# Patient Record
Sex: MALE | Race: WHITE | NOT HISPANIC OR LATINO | Employment: FULL TIME | URBAN - NONMETROPOLITAN AREA
[De-identification: names, ages, dates, MRNs, and addresses within clinical notes are randomized per-mention and may not be internally consistent; named-entity substitution may affect disease eponyms.]

---

## 2017-01-06 ENCOUNTER — TELEPHONE (OUTPATIENT)
Dept: UROLOGY | Facility: CLINIC | Age: 42
End: 2017-01-06

## 2017-01-06 NOTE — TELEPHONE ENCOUNTER
Patients first semen sample was positive. Dr Gracia looked at it and would like for him to bring another sample in one month and to continue using birth control.

## 2017-01-11 ENCOUNTER — OFFICE VISIT (OUTPATIENT)
Dept: OTOLARYNGOLOGY | Facility: CLINIC | Age: 42
End: 2017-01-11

## 2017-01-11 VITALS
TEMPERATURE: 97.3 F | SYSTOLIC BLOOD PRESSURE: 130 MMHG | BODY MASS INDEX: 25.87 KG/M2 | HEIGHT: 72 IN | WEIGHT: 191 LBS | HEART RATE: 80 BPM | RESPIRATION RATE: 18 BRPM | DIASTOLIC BLOOD PRESSURE: 80 MMHG

## 2017-01-11 DIAGNOSIS — D49.1 LARYNGEAL NEOPLASM: ICD-10-CM

## 2017-01-11 DIAGNOSIS — R09.89 GLOBUS SENSATION: Primary | ICD-10-CM

## 2017-01-11 DIAGNOSIS — J35.1 LINGUAL TONSIL HYPERTROPHY: ICD-10-CM

## 2017-01-11 DIAGNOSIS — Z72.0 TOBACCO ABUSE: ICD-10-CM

## 2017-01-11 DIAGNOSIS — J35.1 TONSILLAR HYPERTROPHY: ICD-10-CM

## 2017-01-11 DIAGNOSIS — K21.9 LARYNGOPHARYNGEAL REFLUX (LPR): ICD-10-CM

## 2017-01-11 PROCEDURE — 99214 OFFICE O/P EST MOD 30 MIN: CPT | Performed by: NURSE PRACTITIONER

## 2017-01-11 PROCEDURE — 4004F PT TOBACCO SCREEN RCVD TLK: CPT | Performed by: NURSE PRACTITIONER

## 2017-01-11 PROCEDURE — 31575 DIAGNOSTIC LARYNGOSCOPY: CPT | Performed by: NURSE PRACTITIONER

## 2017-01-11 RX ORDER — PANTOPRAZOLE SODIUM 40 MG/1
40 TABLET, DELAYED RELEASE ORAL DAILY
Qty: 30 TABLET | Refills: 5 | Status: SHIPPED | OUTPATIENT
Start: 2017-01-11 | End: 2017-01-11 | Stop reason: SDUPTHER

## 2017-01-11 RX ORDER — PANTOPRAZOLE SODIUM 40 MG/1
TABLET, DELAYED RELEASE ORAL
Qty: 30 TABLET | Refills: 5 | Status: SHIPPED | OUTPATIENT
Start: 2017-01-11

## 2017-01-11 NOTE — PROGRESS NOTES
OPERATIVE NOTE:    PROCEDURE:   Flexible Fiberoptic Laryngoscopy    ANESTHESIA:  None    REASON FOR PROCEDURE:  Procedure was recommend for suspicious clinical behavior unresponsive to medical management.  Risks, benefits and alternatives were discussed.      DETAILS of OPERATION:  The patient was seated in the exam chair.  A flexible fiberoptic laryngoscopy was performed through the nasal cavity.  The scope was introduced into the oral cavity and directed to the level of the glottis, examining the structures of the oropharynx, base of tongue, vallecula, supraglottic larynx, glottic larynx, and hypopharynx.      FINDINGS:  Mucosal surfaces:   The mucosal surfaces demonstrated normal mucosa surfaces without inflammation.    NASOPHARYNX:  Normal to inspection - no mass    Base of tongue:  The base of tongue was found to have moderate hypertrophy    Epiglottis:  The epiglottis was found to have severe erythema to laryngeal surface    Aryepligottic fold:  The AE folds were found to have no mass or lesion.    False Vocal Fold:  The false cords were found to have no mass or lesion.    True Vocal Cord:  The true vocal cords were found to have no mass or lesion.    Arytenoid:   The arytenoids were found to have erythema    Hypopharynx:  The hypopharynx was found to have no mass or lesion.    The patient tolerated procedure well.

## 2017-01-11 NOTE — MR AVS SNAPSHOT
Donald Kasper   1/11/2017 11:00 AM   Office Visit    Dept Phone:  [phone]   Encounter #:  [id number]    Provider:  MITESH Almeida   Department:  [facility]                Your Full Care Plan              Today's Medication Changes          These changes are accurate as of: 1/11/17 11:49 AM.  If you have any questions, ask your nurse or doctor.               Stop taking medication(s)listed here:     HYDROcodone-acetaminophen 7.5-325 MG per tablet   Commonly known as:  NORCO   Stopped by:  MITESH Almeida                      Your Updated Medication List          This list is accurate as of: 1/11/17 11:49 AM.  Always use your most recent med list.                ALPRAZolam 2 MG tablet   Commonly known as:  XANAX   Take 1 tablet by mouth As Needed for anxiety (BRING THIS TO PROCEDURE TO TAKE BEFORE) for up to 1 dose.       aspirin 81 MG EC tablet       atorvastatin 20 MG tablet   Commonly known as:  LIPITOR       LORazepam 1 MG tablet   Commonly known as:  ATIVAN       MULTIVITAMIN ADULT tablet       omeprazole 20 MG capsule   Commonly known as:  priLOSEC       Testosterone Cypionate & Prop 200-20 MG/ML solution               You Were Diagnosed With        Codes Comments    Globus sensation    -  Primary ICD-10-CM: F45.8  ICD-9-CM: 306.4     Lingual tonsil hypertrophy     ICD-10-CM: J35.1  ICD-9-CM: 474.11       Instructions     None    Patient Instructions History      Upcoming Appointments     Visit Type Date Time Department    FOLLOW UP 1/11/2017 11:00 AM [facility] [place]    FOLLOW UP 4/17/2017 11:00 AM [facility] [place]      [employer] Signup     [facility] [employer] allows you to send messages to your doctor, view your test results, renew your prescriptions, schedule appointments, and more. To sign up, go to [employer] and click on the Sign Up Now link in the New User? box. Enter your [employer] Activation Code exactly as it appears below along with the  "last four digits of your Social Security Number and your Date of Birth () to complete the sign-up process. If you do not sign up before the expiration date, you must request a new code.    [employer] Activation Code: [email or web address]  Expires: 2017 11:47 AM    If you have questions, you can email [email or web address] or call [phone] to talk to our [employer] staff. Remember, [employer] is NOT to be used for urgent needs. For medical emergencies, dial 911.               Other Info from Your Visit           Your Appointments     2017 11:00 AM CDT   Follow Up with MITESH Almeida   [facility] (--)    [address] [place] [address]   [address]  [facility] [id number] [phone]           Arrive 15 minutes prior to appointment.              Allergies     No Known Allergies      Reason for Visit     Follow-up Throat      Vital Signs     Blood Pressure Pulse Temperature Respirations Height Weight    130/80 (Patient Position: Sitting) 80 97.3 °F (36.3 °C) 18 72\" (182.9 cm) 191 lb (86.6 kg)    Body Mass Index Smoking Status                25.9 kg/m2 Current Every Day Smoker          Problems and Diagnoses Noted     Sensation of lump in throat    -  Primary    Lingual tonsil hypertrophy            "

## 2017-01-11 NOTE — PROGRESS NOTES
YOB: 1975  Location: [facility]  Location Address: [address] [place] [address], [facility], [address] [place] [address]  Location Phone: [phone]    Chief Complaint   Patient presents with   • Follow-up     Throat       History of Present Illness  Donald Kasper is a 41 y.o. male.  Donald Kasper is here for follow up of ENT complaints. The patient has had problems with globus sensation, feeling of growth in throat especially on chin tuck.  The symptoms are localized to the central hyoid region and throat. The patient has had worsening symptoms. The symptoms have been present for the last several months . . The symptoms are aggravated by  no identifiable factors . The symptoms are improved by no identifieable factors.  Had endoscopy about 3 years ago Dr. Mane.  He is on reflux meds which arent completely controlling his symptoms.  He smokes about 1 ppd. Rare ETOH consumption.  He denies obvious hoarseness.  Positive for strep about 4 months ago was treated with oral abx at that time.  Denies obvious tonsil complaints.       Past Medical History   Diagnosis Date   • Anxiety    • GERD (gastroesophageal reflux disease)    • Hyperlipidemia        Past Surgical History   Procedure Laterality Date   • Vasectomy           Current Outpatient Prescriptions:   •  ALPRAZolam (XANAX) 2 MG tablet, Take 1 tablet by mouth As Needed for anxiety (BRING THIS TO PROCEDURE TO TAKE BEFORE) for up to 1 dose., Disp: 1 tablet, Rfl: 0  •  aspirin 81 MG EC tablet, Take 81 mg by mouth Daily., Disp: , Rfl:   •  atorvastatin (LIPITOR) 20 MG tablet, Take 20 mg by mouth Daily., Disp: , Rfl:   •  LORazepam (ATIVAN) 1 MG tablet, , Disp: , Rfl:   •  Multiple Vitamins-Minerals (MULTIVITAMIN ADULT) tablet, Take 1 tablet by mouth Daily., Disp: , Rfl:   •  omeprazole (priLOSEC) 20 MG capsule, Take 20 mg by mouth Daily., Disp: , Rfl:   •  Testosterone Cypionate & Prop 200-20 MG/ML solution, Inject 1 mL into the shoulder, thigh, or buttocks Every  14 (Fourteen) Days., Disp: , Rfl:   •  pantoprazole (PROTONIX) 40 MG EC tablet, Take one tablet 30 minute before breakfast, Disp: 30 tablet, Rfl: 5    Review of patient's allergies indicates no known allergies.    Family History   Problem Relation Age of Onset   • Cancer Father    • Cancer Maternal Grandmother        Social History     Social History   • Marital status: Single     Spouse name: N/A   • Number of children: N/A   • Years of education: N/A     Occupational History   • Not on file.     Social History Main Topics   • Smoking status: Current Every Day Smoker     Packs/day: 0.50   • Smokeless tobacco: Never Used   • Alcohol use Yes      Comment: occasionally   • Drug use: Defer   • Sexual activity: Not on file     Other Topics Concern   • Not on file     Social History Narrative       Review of Systems   Constitutional: Negative.    HENT:        SEE HPI   Eyes: Negative.    Respiratory: Negative.    Cardiovascular: Negative.    Gastrointestinal: Negative.    Endocrine: Negative.    Genitourinary: Negative.    Musculoskeletal: Negative.    Skin: Negative.    Allergic/Immunologic: Negative.    Neurological: Negative.    Hematological: Negative.    Psychiatric/Behavioral: Negative.        Vitals:    01/11/17 1100   BP: 130/80   Pulse: 80   Resp: 18   Temp: 97.3 °F (36.3 °C)       Objective     Physical Exam  CONSTITUTIONAL: well nourished, alert, oriented, in no acute distress     COMMUNICATION AND VOICE: able to communicate normally, normal voice quality    HEAD: normocephalic, no lesions, atraumatic, no tenderness, no masses     FACE: appearance normal, no lesions, no tenderness, no deformities, facial motion symmetric    SALIVARY GLANDS: parotid glands with no tenderness, no swelling, no masses, submandibular glands with normal size, nontender    EYES: ocular motility normal, eyelids normal, orbits normal, no proptosis, conjunctiva normal , pupils equal, round     EARS:  Hearing: response to conversational  voice normal bilaterally   External Ears: auricles without lesions  Otoscopic: tympanic membrane appearance normal, no lesions, no perforation, normal mobility, no fluid    NOSE:  External Nose: structure normal, no tenderness on palpation, no nasal discharge, no lesions, no evidence of trauma, nostrils patent   Intranasal Exam: nasal mucosa normal, vestibule within normal limits, inferior turbinate normal, nasal septum midline      ORAL:  Lips: upper and lower lips without lesion   Teeth: dentition within normal limits for age   Gums: gingivae healthy   Oral Mucosa: oral mucosa normal, no mucosal lesions   Floor of Mouth: Warthin’s duct patent, mucosa normal  Tongue: lingual mucosa normal without lesions, normal tongue mobility   Palate: soft and hard palates with normal mucosa and structure  Oropharynx: tonsils 2+ heavily cryptic R>L - soft to palpation    HYPOPHARYNX:   LARYNX: see scope    NECK: neck appearance normal, no mass,  noted without erythema or tenderness    THYROID: no overt thyromegaly, no tenderness, nodules or mass present on palpation, position midline     LYMPH NODES: no lymphadenopathy    CHEST/RESPIRATORY: respiratory effort normal    CARDIOVASCULAR:  extremities without cyanosis or edema      NEUROLOGIC/PSYCHIATRIC: oriented to time, place and person, mood normal, affect appropriate, CN II-XII intact grossly    Assessment/Plan   Donald was seen today for follow-up.    Diagnoses and all orders for this visit:    Globus sensation  -     CT Soft Tissue Neck With Contrast; Future  -     Creatinine, Serum; Future    Lingual tonsil hypertrophy  -     CT Soft Tissue Neck With Contrast; Future  -     Creatinine, Serum; Future    Laryngopharyngeal reflux (LPR)    Tobacco abuse    r/o Laryngeal neoplasm    Tonsillar hypertrophy    Other orders  -     Discontinue: pantoprazole (PROTONIX) 40 MG EC tablet; Take 1 tablet by mouth Daily for 30 days. Take 40 mg by mouth 30 minute-1 hour before the last meal  before going to bed  -     pantoprazole (PROTONIX) 40 MG EC tablet; Take one tablet 30 minute before breakfast      * Surgery not found *  Orders Placed This Encounter   Procedures   • CT Soft Tissue Neck With Contrast     Standing Status:   Future     Standing Expiration Date:   1/11/2018     Scheduling Instructions:      Try to schedule on a Monday if possible     Order Specific Question:   Reason for Exam:     Answer:   r/o laryngeal mass, lingual tonsil hypertrophy, globus sensation   • Creatinine, Serum     Standing Status:   Future     Standing Expiration Date:   1/11/2018     Return in about 3 months (around 4/11/2017).       Patient Instructions   Steps to Quit Smoking   Smoking tobacco can be harmful to your health and can affect almost every organ in your body. Smoking puts you, and those around you, at risk for developing many serious chronic diseases. Quitting smoking is difficult, but it is one of the best things that you can do for your health. It is never too late to quit.  WHAT ARE THE BENEFITS OF QUITTING SMOKING?  When you quit smoking, you lower your risk of developing serious diseases and conditions, such as:  · Lung cancer or lung disease, such as COPD.  · Heart disease.  · Stroke.  · Heart attack.  · Infertility.  · Osteoporosis and bone fractures.  Additionally, symptoms such as coughing, wheezing, and shortness of breath may get better when you quit. You may also find that you get sick less often because your body is stronger at fighting off colds and infections. If you are pregnant, quitting smoking can help to reduce your chances of having a baby of low birth weight.  HOW DO I GET READY TO QUIT?  When you decide to quit smoking, create a plan to make sure that you are successful. Before you quit:  · Pick a date to quit. Set a date within the next two weeks to give you time to prepare.  · Write down the reasons why you are quitting. Keep this list in places where you will see it often, such  "as on your bathroom mirror or in your car or wallet.  · Identify the people, places, things, and activities that make you want to smoke (triggers) and avoid them. Make sure to take these actions:    Throw away all cigarettes at home, at work, and in your car.    Throw away smoking accessories, such as ashtrays and lighters.    Clean your car and make sure to empty the ashtray.    Clean your home, including curtains and carpets.  · Tell your family, friends, and coworkers that you are quitting. Support from your loved ones can make quitting easier.  · Talk with your health care provider about your options for quitting smoking.  · Find out what treatment options are covered by your health insurance.  WHAT STRATEGIES CAN I USE TO QUIT SMOKING?   Talk with your healthcare provider about different strategies to quit smoking. Some strategies include:  · Quitting smoking altogether instead of gradually lessening how much you smoke over a period of time. Research shows that quitting \"cold turkey\" is more successful than gradually quitting.  · Attending in-person counseling to help you build problem-solving skills. You are more likely to have success in quitting if you attend several counseling sessions. Even short sessions of 10 minutes can be effective.  · Finding resources and support systems that can help you to quit smoking and remain smoke-free after you quit. These resources are most helpful when you use them often. They can include:    Online chats with a counselor.    Telephone quitlines.    Printed self-help materials.    Support groups or group counseling.    Text messaging programs.    Mobile phone applications.  · Taking medicines to help you quit smoking. (If you are pregnant or breastfeeding, talk with your health care provider first.) Some medicines contain nicotine and some do not. Both types of medicines help with cravings, but the medicines that include nicotine help to relieve withdrawal symptoms. Your " health care provider may recommend:    Nicotine patches, gum, or lozenges.    Nicotine inhalers or sprays.    Non-nicotine medicine that is taken by mouth.  Talk with your health care provider about combining strategies, such as taking medicines while you are also receiving in-person counseling. Using these two strategies together makes you more likely to succeed in quitting than if you used either strategy on its own.  If you are pregnant or breastfeeding, talk with your health care provider about finding counseling or other support strategies to quit smoking. Do not take medicine to help you quit smoking unless told to do so by your health care provider.  WHAT THINGS CAN I DO TO MAKE IT EASIER TO QUIT?  Quitting smoking might feel overwhelming at first, but there is a lot that you can do to make it easier. Take these important actions:  · Reach out to your family and friends and ask that they support and encourage you during this time. Call telephone quitlines, reach out to support groups, or work with a counselor for support.  · Ask people who smoke to avoid smoking around you.  · Avoid places that trigger you to smoke, such as bars, parties, or smoke-break areas at work.  · Spend time around people who do not smoke.  · Lessen stress in your life, because stress can be a smoking trigger for some people. To lessen stress, try:    Exercising regularly.    Deep-breathing exercises.    Yoga.    Meditating.    Performing a body scan. This involves closing your eyes, scanning your body from head to toe, and noticing which parts of your body are particularly tense. Purposefully relax the muscles in those areas.  · Download or purchase mobile phone or tablet apps (applications) that can help you stick to your quit plan by providing reminders, tips, and encouragement. There are many free apps, such as QuitGuide from the CDC (Centers for Disease Control and Prevention). You can find other support for quitting smoking  (smoking cessation) through smokefree.gov and other websites.  HOW WILL I FEEL WHEN I QUIT SMOKING?  Within the first 24 hours of quitting smoking, you may start to feel some withdrawal symptoms. These symptoms are usually most noticeable 2-3 days after quitting, but they usually do not last beyond 2-3 weeks. Changes or symptoms that you might experience include:  · Mood swings.  · Restlessness, anxiety, or irritation.  · Difficulty concentrating.  · Dizziness.  · Strong cravings for sugary foods in addition to nicotine.  · Mild weight gain.  · Constipation.  · Nausea.  · Coughing or a sore throat.  · Changes in how your medicines work in your body.  · A depressed mood.  · Difficulty sleeping (insomnia).  After the first 2-3 weeks of quitting, you may start to notice more positive results, such as:  · Improved sense of smell and taste.  · Decreased coughing and sore throat.  · Slower heart rate.  · Lower blood pressure.  · Clearer skin.  · The ability to breathe more easily.  · Fewer sick days.  Quitting smoking is very challenging for most people. Do not get discouraged if you are not successful the first time. Some people need to make many attempts to quit before they achieve long-term success. Do your best to stick to your quit plan, and talk with your health care provider if you have any questions or concerns.     This information is not intended to replace advice given to you by your health care provider. Make sure you discuss any questions you have with your health care provider.     Document Released: 12/12/2002 Document Revised: 05/03/2016 Document Reviewed: 05/03/2016  [employer] Interactive Patient Education ©2016 [employer]  Gastroesophageal Reflux Disease, Adult  Normally, food travels down the esophagus and stays in the stomach to be digested. However, when a person has gastroesophageal reflux disease (GERD), food and stomach acid move back up into the esophagus. When this happens, the esophagus  becomes sore and inflamed. Over time, GERD can create small holes (ulcers) in the lining of the esophagus.   CAUSES  This condition is caused by a problem with the muscle between the esophagus and the stomach (lower esophageal sphincter, or LES). Normally, the LES muscle closes after food passes through the esophagus to the stomach. When the LES is weakened or abnormal, it does not close properly, and that allows food and stomach acid to go back up into the esophagus. The LES can be weakened by certain dietary substances, medicines, and medical conditions, including:  · Tobacco use.  · Pregnancy.  · Having a hiatal hernia.  · Heavy alcohol use.  · Certain foods and beverages, such as coffee, chocolate, onions, and peppermint.  RISK FACTORS  This condition is more likely to develop in:  · People who have an increased body weight.  · People who have connective tissue disorders.  · People who use NSAID medicines.  SYMPTOMS  Symptoms of this condition include:  · Heartburn.  · Difficult or painful swallowing.  · The feeling of having a lump in the throat.  · A bitter taste in the mouth.  · Bad breath.  · Having a large amount of saliva.  · Having an upset or bloated stomach.  · Belching.  · Chest pain.  · Shortness of breath or wheezing.  · Ongoing (chronic) cough or a night-time cough.  · Wearing away of tooth enamel.  · Weight loss.  Different conditions can cause chest pain. Make sure to see your health care provider if you experience chest pain.  DIAGNOSIS  Your health care provider will take a medical history and perform a physical exam. To determine if you have mild or severe GERD, your health care provider may also monitor how you respond to treatment. You may also have other tests, including:  · An endoscopy to examine your stomach and esophagus with a small camera.  · A test that measures the acidity level in your esophagus.  · A test that measures how much pressure is on your esophagus.  · A barium swallow or  modified barium swallow to show the shape, size, and functioning of your esophagus.  TREATMENT  The goal of treatment is to help relieve your symptoms and to prevent complications. Treatment for this condition may vary depending on how severe your symptoms are. Your health care provider may recommend:  · Changes to your diet.  · Medicine.  · Surgery.  HOME CARE INSTRUCTIONS  Diet  · Follow a diet as recommended by your health care provider. This may involve avoiding foods and drinks such as:    Coffee and tea (with or without caffeine).    Drinks that contain alcohol.    Energy drinks and sports drinks.    Carbonated drinks or sodas.    Chocolate and cocoa.    Peppermint and mint flavorings.    Garlic and onions.    Horseradish.    Spicy and acidic foods, including peppers, chili powder, cardona powder, vinegar, hot sauces, and barbecue sauce.    Citrus fruit juices and citrus fruits, such as oranges, saud, and limes.    Tomato-based foods, such as red sauce, chili, salsa, and pizza with red sauce.    Fried and fatty foods, such as donuts, french fries, potato chips, and high-fat dressings.    High-fat meats, such as hot dogs and fatty cuts of red and white meats, such as rib eye steak, sausage, ham, and huerta.    High-fat dairy items, such as whole milk, butter, and cream cheese.  · Eat small, frequent meals instead of large meals.  · Avoid drinking large amounts of liquid with your meals.  · Avoid eating meals during the 2-3 hours before bedtime.  · Avoid lying down right after you eat.  · Do not exercise right after you eat.   General Instructions   · Pay attention to any changes in your symptoms.  · Take over-the-counter and prescription medicines only as told by your health care provider. Do not take aspirin, ibuprofen, or other NSAIDs unless your health care provider told you to do so.  · Do not use any tobacco products, including cigarettes, chewing tobacco, and e-cigarettes. If you need help quitting, ask  your health care provider.  · Wear loose-fitting clothing. Do not wear anything tight around your waist that causes pressure on your abdomen.  · Raise (elevate) the head of your bed 6 inches (15cm).  · Try to reduce your stress, such as with yoga or meditation. If you need help reducing stress, ask your health care provider.  · If you are overweight, reduce your weight to an amount that is healthy for you. Ask your health care provider for guidance about a safe weight loss goal.  · Keep all follow-up visits as told by your health care provider. This is important.  SEEK MEDICAL CARE IF:  · You have new symptoms.  · You have unexplained weight loss.  · You have difficulty swallowing, or it hurts to swallow.  · You have wheezing or a persistent cough.  · Your symptoms do not improve with treatment.  · You have a hoarse voice.  SEEK IMMEDIATE MEDICAL CARE IF:  · You have pain in your arms, neck, jaw, teeth, or back.  · You feel sweaty, dizzy, or light-headed.  · You have chest pain or shortness of breath.  · You vomit and your vomit looks like blood or coffee grounds.  · You faint.  · Your stool is bloody or black.  · You cannot swallow, drink, or eat.     This information is not intended to replace advice given to you by your health care provider. Make sure you discuss any questions you have with your health care provider.     Document Released: 09/27/2006 Document Revised: 09/07/2016 Document Reviewed: 04/13/2016  [employer] Interactive Patient Education ©2016 [employer]    Adjust reflux meds, increase dose.  Suspect globus related to smoking, reflux and lingual tonsil hypertrophy, but will check CT to r/o neoplasm.    Call for problems or worsening symptoms  Consider referral back to Dr. Mane as well for rescope -endoscopy.  STOP omeprazole

## 2017-01-11 NOTE — PATIENT INSTRUCTIONS
Steps to Quit Smoking   Smoking tobacco can be harmful to your health and can affect almost every organ in your body. Smoking puts you, and those around you, at risk for developing many serious chronic diseases. Quitting smoking is difficult, but it is one of the best things that you can do for your health. It is never too late to quit.  WHAT ARE THE BENEFITS OF QUITTING SMOKING?  When you quit smoking, you lower your risk of developing serious diseases and conditions, such as:  · Lung cancer or lung disease, such as COPD.  · Heart disease.  · Stroke.  · Heart attack.  · Infertility.  · Osteoporosis and bone fractures.  Additionally, symptoms such as coughing, wheezing, and shortness of breath may get better when you quit. You may also find that you get sick less often because your body is stronger at fighting off colds and infections. If you are pregnant, quitting smoking can help to reduce your chances of having a baby of low birth weight.  HOW DO I GET READY TO QUIT?  When you decide to quit smoking, create a plan to make sure that you are successful. Before you quit:  · Pick a date to quit. Set a date within the next two weeks to give you time to prepare.  · Write down the reasons why you are quitting. Keep this list in places where you will see it often, such as on your bathroom mirror or in your car or wallet.  · Identify the people, places, things, and activities that make you want to smoke (triggers) and avoid them. Make sure to take these actions:    Throw away all cigarettes at home, at work, and in your car.    Throw away smoking accessories, such as ashtrays and lighters.    Clean your car and make sure to empty the ashtray.    Clean your home, including curtains and carpets.  · Tell your family, friends, and coworkers that you are quitting. Support from your loved ones can make quitting easier.  · Talk with your health care provider about your options for quitting smoking.  · Find out what treatment  "options are covered by your health insurance.  WHAT STRATEGIES CAN I USE TO QUIT SMOKING?   Talk with your healthcare provider about different strategies to quit smoking. Some strategies include:  · Quitting smoking altogether instead of gradually lessening how much you smoke over a period of time. Research shows that quitting \"cold turkey\" is more successful than gradually quitting.  · Attending in-person counseling to help you build problem-solving skills. You are more likely to have success in quitting if you attend several counseling sessions. Even short sessions of 10 minutes can be effective.  · Finding resources and support systems that can help you to quit smoking and remain smoke-free after you quit. These resources are most helpful when you use them often. They can include:    Online chats with a counselor.    Telephone quitlines.    Printed self-help materials.    Support groups or group counseling.    Text messaging programs.    Mobile phone applications.  · Taking medicines to help you quit smoking. (If you are pregnant or breastfeeding, talk with your health care provider first.) Some medicines contain nicotine and some do not. Both types of medicines help with cravings, but the medicines that include nicotine help to relieve withdrawal symptoms. Your health care provider may recommend:    Nicotine patches, gum, or lozenges.    Nicotine inhalers or sprays.    Non-nicotine medicine that is taken by mouth.  Talk with your health care provider about combining strategies, such as taking medicines while you are also receiving in-person counseling. Using these two strategies together makes you more likely to succeed in quitting than if you used either strategy on its own.  If you are pregnant or breastfeeding, talk with your health care provider about finding counseling or other support strategies to quit smoking. Do not take medicine to help you quit smoking unless told to do so by your health care " provider.  WHAT THINGS CAN I DO TO MAKE IT EASIER TO QUIT?  Quitting smoking might feel overwhelming at first, but there is a lot that you can do to make it easier. Take these important actions:  · Reach out to your family and friends and ask that they support and encourage you during this time. Call telephone quitlines, reach out to support groups, or work with a counselor for support.  · Ask people who smoke to avoid smoking around you.  · Avoid places that trigger you to smoke, such as bars, parties, or smoke-break areas at work.  · Spend time around people who do not smoke.  · Lessen stress in your life, because stress can be a smoking trigger for some people. To lessen stress, try:    Exercising regularly.    Deep-breathing exercises.    Yoga.    Meditating.    Performing a body scan. This involves closing your eyes, scanning your body from head to toe, and noticing which parts of your body are particularly tense. Purposefully relax the muscles in those areas.  · Download or purchase mobile phone or tablet apps (applications) that can help you stick to your quit plan by providing reminders, tips, and encouragement. There are many free apps, such as QuitGuide from the CDC (Centers for Disease Control and Prevention). You can find other support for quitting smoking (smoking cessation) through smokefree.gov and other websites.  HOW WILL I FEEL WHEN I QUIT SMOKING?  Within the first 24 hours of quitting smoking, you may start to feel some withdrawal symptoms. These symptoms are usually most noticeable 2-3 days after quitting, but they usually do not last beyond 2-3 weeks. Changes or symptoms that you might experience include:  · Mood swings.  · Restlessness, anxiety, or irritation.  · Difficulty concentrating.  · Dizziness.  · Strong cravings for sugary foods in addition to nicotine.  · Mild weight gain.  · Constipation.  · Nausea.  · Coughing or a sore throat.  · Changes in how your medicines work in your  body.  · A depressed mood.  · Difficulty sleeping (insomnia).  After the first 2-3 weeks of quitting, you may start to notice more positive results, such as:  · Improved sense of smell and taste.  · Decreased coughing and sore throat.  · Slower heart rate.  · Lower blood pressure.  · Clearer skin.  · The ability to breathe more easily.  · Fewer sick days.  Quitting smoking is very challenging for most people. Do not get discouraged if you are not successful the first time. Some people need to make many attempts to quit before they achieve long-term success. Do your best to stick to your quit plan, and talk with your health care provider if you have any questions or concerns.     This information is not intended to replace advice given to you by your health care provider. Make sure you discuss any questions you have with your health care provider.     Document Released: 12/12/2002 Document Revised: 05/03/2016 Document Reviewed: 05/03/2016  "[employer] Interactive Patient Education ©2016 "[employer] Inc.  Gastroesophageal Reflux Disease, Adult  Normally, food travels down the esophagus and stays in the stomach to be digested. However, when a person has gastroesophageal reflux disease (GERD), food and stomach acid move back up into the esophagus. When this happens, the esophagus becomes sore and inflamed. Over time, GERD can create small holes (ulcers) in the lining of the esophagus.   CAUSES  This condition is caused by a problem with the muscle between the esophagus and the stomach (lower esophageal sphincter, or LES). Normally, the LES muscle closes after food passes through the esophagus to the stomach. When the LES is weakened or abnormal, it does not close properly, and that allows food and stomach acid to go back up into the esophagus. The LES can be weakened by certain dietary substances, medicines, and medical conditions, including:  · Tobacco use.  · Pregnancy.  · Having a hiatal hernia.  · Heavy alcohol  use.  · Certain foods and beverages, such as coffee, chocolate, onions, and peppermint.  RISK FACTORS  This condition is more likely to develop in:  · People who have an increased body weight.  · People who have connective tissue disorders.  · People who use NSAID medicines.  SYMPTOMS  Symptoms of this condition include:  · Heartburn.  · Difficult or painful swallowing.  · The feeling of having a lump in the throat.  · A bitter taste in the mouth.  · Bad breath.  · Having a large amount of saliva.  · Having an upset or bloated stomach.  · Belching.  · Chest pain.  · Shortness of breath or wheezing.  · Ongoing (chronic) cough or a night-time cough.  · Wearing away of tooth enamel.  · Weight loss.  Different conditions can cause chest pain. Make sure to see your health care provider if you experience chest pain.  DIAGNOSIS  Your health care provider will take a medical history and perform a physical exam. To determine if you have mild or severe GERD, your health care provider may also monitor how you respond to treatment. You may also have other tests, including:  · An endoscopy to examine your stomach and esophagus with a small camera.  · A test that measures the acidity level in your esophagus.  · A test that measures how much pressure is on your esophagus.  · A barium swallow or modified barium swallow to show the shape, size, and functioning of your esophagus.  TREATMENT  The goal of treatment is to help relieve your symptoms and to prevent complications. Treatment for this condition may vary depending on how severe your symptoms are. Your health care provider may recommend:  · Changes to your diet.  · Medicine.  · Surgery.  HOME CARE INSTRUCTIONS  Diet  · Follow a diet as recommended by your health care provider. This may involve avoiding foods and drinks such as:    Coffee and tea (with or without caffeine).    Drinks that contain alcohol.    Energy drinks and sports drinks.    Carbonated drinks or sodas.     Chocolate and cocoa.    Peppermint and mint flavorings.    Garlic and onions.    Horseradish.    Spicy and acidic foods, including peppers, chili powder, cardona powder, vinegar, hot sauces, and barbecue sauce.    Citrus fruit juices and citrus fruits, such as oranges, saud, and limes.    Tomato-based foods, such as red sauce, chili, salsa, and pizza with red sauce.    Fried and fatty foods, such as donuts, french fries, potato chips, and high-fat dressings.    High-fat meats, such as hot dogs and fatty cuts of red and white meats, such as rib eye steak, sausage, ham, and huerta.    High-fat dairy items, such as whole milk, butter, and cream cheese.  · Eat small, frequent meals instead of large meals.  · Avoid drinking large amounts of liquid with your meals.  · Avoid eating meals during the 2-3 hours before bedtime.  · Avoid lying down right after you eat.  · Do not exercise right after you eat.   General Instructions   · Pay attention to any changes in your symptoms.  · Take over-the-counter and prescription medicines only as told by your health care provider. Do not take aspirin, ibuprofen, or other NSAIDs unless your health care provider told you to do so.  · Do not use any tobacco products, including cigarettes, chewing tobacco, and e-cigarettes. If you need help quitting, ask your health care provider.  · Wear loose-fitting clothing. Do not wear anything tight around your waist that causes pressure on your abdomen.  · Raise (elevate) the head of your bed 6 inches (15cm).  · Try to reduce your stress, such as with yoga or meditation. If you need help reducing stress, ask your health care provider.  · If you are overweight, reduce your weight to an amount that is healthy for you. Ask your health care provider for guidance about a safe weight loss goal.  · Keep all follow-up visits as told by your health care provider. This is important.  SEEK MEDICAL CARE IF:  · You have new symptoms.  · You have unexplained  weight loss.  · You have difficulty swallowing, or it hurts to swallow.  · You have wheezing or a persistent cough.  · Your symptoms do not improve with treatment.  · You have a hoarse voice.  SEEK IMMEDIATE MEDICAL CARE IF:  · You have pain in your arms, neck, jaw, teeth, or back.  · You feel sweaty, dizzy, or light-headed.  · You have chest pain or shortness of breath.  · You vomit and your vomit looks like blood or coffee grounds.  · You faint.  · Your stool is bloody or black.  · You cannot swallow, drink, or eat.     This information is not intended to replace advice given to you by your health care provider. Make sure you discuss any questions you have with your health care provider.     Document Released: 09/27/2006 Document Revised: 09/07/2016 Document Reviewed: 04/13/2016  [employer] Interactive Patient Education ©2016 [employer]    Adjust reflux meds, increase dose.  Suspect globus related to smoking, reflux and lingual tonsil hypertrophy, but will check CT to r/o neoplasm.    Call for problems or worsening symptoms  Consider referral back to Dr. Mane as well for rescope -endoscopy.  STOP omeprazole

## 2017-01-23 ENCOUNTER — HOSPITAL ENCOUNTER (OUTPATIENT)
Dept: CT IMAGING | Facility: HOSPITAL | Age: 42
Discharge: HOME | End: 2017-01-23
Admitting: NURSE PRACTITIONER

## 2017-01-23 DIAGNOSIS — R09.89 GLOBUS SENSATION: ICD-10-CM

## 2017-01-23 DIAGNOSIS — J35.1 LINGUAL TONSIL HYPERTROPHY: ICD-10-CM

## 2017-01-23 LAB — CREAT BLDA-MCNC: 1.3 MG/DL (ref 0.6–1.3)

## 2017-01-23 PROCEDURE — 70491 CT SOFT TISSUE NECK W/DYE: CPT

## 2017-01-23 PROCEDURE — 82565 ASSAY OF CREATININE: CPT

## 2017-01-23 PROCEDURE — 0 IOPAMIDOL PER 1 ML: Performed by: NURSE PRACTITIONER

## 2017-01-23 RX ADMIN — IOPAMIDOL 100 ML: 755 INJECTION, SOLUTION INTRAVENOUS at 09:30

## 2017-01-31 ENCOUNTER — TELEPHONE (OUTPATIENT)
Dept: OTOLARYNGOLOGY | Facility: CLINIC | Age: 42
End: 2017-01-31

## 2017-01-31 NOTE — TELEPHONE ENCOUNTER
Pt called for CT results, per Ladi it is negative for masses or lymphadenopathy.  Only mild sinus disease

## 2017-02-06 ENCOUNTER — TELEPHONE (OUTPATIENT)
Dept: UROLOGY | Facility: CLINIC | Age: 42
End: 2017-02-06

## 2017-02-06 NOTE — TELEPHONE ENCOUNTER
Called and left a voicemail stating that the patients second semen sample was negative and that he needs to bring in another sample in 4-6 weeks and to also continue with birth control until then. ML

## 2017-04-07 ENCOUNTER — OFFICE VISIT (OUTPATIENT)
Dept: PRIMARY CARE CLINIC | Age: 42
End: 2017-04-07
Payer: COMMERCIAL

## 2017-04-07 ENCOUNTER — TELEPHONE (OUTPATIENT)
Dept: PRIMARY CARE CLINIC | Age: 42
End: 2017-04-07

## 2017-04-07 VITALS
OXYGEN SATURATION: 96 % | HEART RATE: 71 BPM | DIASTOLIC BLOOD PRESSURE: 88 MMHG | HEIGHT: 72 IN | SYSTOLIC BLOOD PRESSURE: 130 MMHG | BODY MASS INDEX: 26.01 KG/M2 | TEMPERATURE: 98.1 F | WEIGHT: 192 LBS

## 2017-04-07 DIAGNOSIS — R79.89 LOW TESTOSTERONE LEVEL IN MALE: ICD-10-CM

## 2017-04-07 DIAGNOSIS — F41.1 GAD (GENERALIZED ANXIETY DISORDER): Primary | ICD-10-CM

## 2017-04-07 DIAGNOSIS — E78.5 HYPERLIPIDEMIA, UNSPECIFIED HYPERLIPIDEMIA TYPE: ICD-10-CM

## 2017-04-07 DIAGNOSIS — F51.01 PRIMARY INSOMNIA: ICD-10-CM

## 2017-04-07 LAB
ALBUMIN SERPL-MCNC: 4.7 G/DL (ref 3.5–5.2)
ALP BLD-CCNC: 65 U/L (ref 40–130)
ALT SERPL-CCNC: 32 U/L (ref 5–41)
ANION GAP SERPL CALCULATED.3IONS-SCNC: 16 MMOL/L (ref 7–19)
AST SERPL-CCNC: 18 U/L (ref 5–40)
BASOPHILS ABSOLUTE: 0 K/UL (ref 0–0.2)
BASOPHILS RELATIVE PERCENT: 0.2 % (ref 0–1)
BILIRUB SERPL-MCNC: 0.4 MG/DL (ref 0.2–1.2)
BUN BLDV-MCNC: 12 MG/DL (ref 6–20)
CALCIUM SERPL-MCNC: 9.5 MG/DL (ref 8.6–10)
CHLORIDE BLD-SCNC: 101 MMOL/L (ref 98–111)
CHOLESTEROL, TOTAL: 169 MG/DL (ref 160–199)
CO2: 26 MMOL/L (ref 22–29)
CREAT SERPL-MCNC: 0.9 MG/DL (ref 0.5–1.2)
CREATININE URINE: 206.6 MG/DL (ref 4.2–622)
EOSINOPHILS ABSOLUTE: 0.1 K/UL (ref 0–0.6)
EOSINOPHILS RELATIVE PERCENT: 2.5 % (ref 0–5)
GFR NON-AFRICAN AMERICAN: >60
GLOBULIN: 2.1 G/DL
GLUCOSE BLD-MCNC: 93 MG/DL (ref 74–109)
HCT VFR BLD CALC: 46 % (ref 42–52)
HDLC SERPL-MCNC: 32 MG/DL (ref 55–121)
HEMOGLOBIN: 15.7 G/DL (ref 14–18)
LDL CHOLESTEROL CALCULATED: 84 MG/DL
LYMPHOCYTES ABSOLUTE: 1.7 K/UL (ref 1.1–4.5)
LYMPHOCYTES RELATIVE PERCENT: 33.3 % (ref 20–40)
MCH RBC QN AUTO: 29.5 PG (ref 27–31)
MCHC RBC AUTO-ENTMCNC: 34.1 G/DL (ref 33–37)
MCV RBC AUTO: 86.3 FL (ref 80–94)
MICROALBUMIN UR-MCNC: 1.2 MG/DL (ref 0–19)
MICROALBUMIN/CREAT UR-RTO: 5.8 MG/G
MONOCYTES ABSOLUTE: 0.3 K/UL (ref 0–0.9)
MONOCYTES RELATIVE PERCENT: 6.6 % (ref 0–10)
NEUTROPHILS ABSOLUTE: 2.9 K/UL (ref 1.5–7.5)
NEUTROPHILS RELATIVE PERCENT: 56.6 % (ref 50–65)
PDW BLD-RTO: 12.7 % (ref 11.5–14.5)
PLATELET # BLD: 216 K/UL (ref 130–400)
PMV BLD AUTO: 9.8 FL (ref 7.4–10.4)
POTASSIUM SERPL-SCNC: 4 MMOL/L (ref 3.5–5)
RBC # BLD: 5.33 M/UL (ref 4.7–6.1)
SODIUM BLD-SCNC: 143 MMOL/L (ref 136–145)
T4 FREE: 1.1 NG/ML (ref 0.9–1.7)
TESTOSTERONE TOTAL: 855.7 NG/DL (ref 249–836)
TOTAL PROTEIN: 6.8 G/DL (ref 6.6–8.7)
TRIGL SERPL-MCNC: 265 MG/DL (ref 150–199)
TSH SERPL DL<=0.05 MIU/L-ACNC: 2.09 UIU/ML (ref 0.27–4.2)
WBC # BLD: 5.1 K/UL (ref 4.8–10.8)

## 2017-04-07 PROCEDURE — 99213 OFFICE O/P EST LOW 20 MIN: CPT | Performed by: NURSE PRACTITIONER

## 2017-04-07 RX ORDER — LORAZEPAM 1 MG/1
1 TABLET ORAL 2 TIMES DAILY PRN
Qty: 60 TABLET | Refills: 0 | Status: SHIPPED | OUTPATIENT
Start: 2017-04-07 | End: 2017-06-22 | Stop reason: SDUPTHER

## 2017-04-07 RX ORDER — ATORVASTATIN CALCIUM 20 MG/1
20 TABLET, FILM COATED ORAL DAILY
Qty: 30 TABLET | Refills: 11 | Status: SHIPPED | OUTPATIENT
Start: 2017-04-07 | End: 2018-04-18

## 2017-04-07 ASSESSMENT — ENCOUNTER SYMPTOMS
COUGH: 0
RHINORRHEA: 0
SORE THROAT: 0
EYE REDNESS: 0
VOMITING: 0
DIARRHEA: 0
SHORTNESS OF BREATH: 0
CONSTIPATION: 0

## 2017-04-14 ENCOUNTER — TELEPHONE (OUTPATIENT)
Dept: UROLOGY | Facility: CLINIC | Age: 42
End: 2017-04-14

## 2017-04-14 NOTE — TELEPHONE ENCOUNTER
3rd semen sample was reviewed by Dr. Gracia which was neg. Called pt no answer left a voicemail for pt to call me back.

## 2017-06-01 ENCOUNTER — OFFICE VISIT (OUTPATIENT)
Dept: PRIMARY CARE CLINIC | Age: 42
End: 2017-06-01
Payer: COMMERCIAL

## 2017-06-01 VITALS
TEMPERATURE: 98.1 F | DIASTOLIC BLOOD PRESSURE: 80 MMHG | SYSTOLIC BLOOD PRESSURE: 130 MMHG | HEIGHT: 72 IN | HEART RATE: 75 BPM | OXYGEN SATURATION: 98 % | BODY MASS INDEX: 24.58 KG/M2 | WEIGHT: 181.5 LBS

## 2017-06-01 DIAGNOSIS — J00 ACUTE NASOPHARYNGITIS: Primary | ICD-10-CM

## 2017-06-01 DIAGNOSIS — E29.1 HYPOGONADISM IN MALE: ICD-10-CM

## 2017-06-01 DIAGNOSIS — J02.9 SORE THROAT: ICD-10-CM

## 2017-06-01 DIAGNOSIS — R09.81 NASAL CONGESTION: ICD-10-CM

## 2017-06-01 LAB — S PYO AG THROAT QL: NORMAL

## 2017-06-01 PROCEDURE — 99213 OFFICE O/P EST LOW 20 MIN: CPT | Performed by: NURSE PRACTITIONER

## 2017-06-01 PROCEDURE — 87880 STREP A ASSAY W/OPTIC: CPT | Performed by: NURSE PRACTITIONER

## 2017-06-01 RX ORDER — AMOXICILLIN 500 MG/1
500 CAPSULE ORAL 3 TIMES DAILY
Qty: 30 CAPSULE | Refills: 0 | Status: SHIPPED | OUTPATIENT
Start: 2017-06-01 | End: 2017-06-11

## 2017-06-01 RX ORDER — PANTOPRAZOLE SODIUM 40 MG/1
40 TABLET, DELAYED RELEASE ORAL DAILY
COMMUNITY
Start: 2017-04-03 | End: 2019-02-11

## 2017-06-01 RX ORDER — TESTOSTERONE CYPIONATE 200 MG/ML
INJECTION INTRAMUSCULAR
Qty: 2 ML | Refills: 1 | Status: SHIPPED | OUTPATIENT
Start: 2017-06-01 | End: 2017-11-29 | Stop reason: SDUPTHER

## 2017-06-01 ASSESSMENT — ENCOUNTER SYMPTOMS
COUGH: 1
SINUS PRESSURE: 1
SHORTNESS OF BREATH: 0
VOMITING: 0
RHINORRHEA: 1
CONSTIPATION: 0
EYE REDNESS: 0
DIARRHEA: 0
SORE THROAT: 1

## 2017-06-22 DIAGNOSIS — F51.01 PRIMARY INSOMNIA: ICD-10-CM

## 2017-06-22 DIAGNOSIS — F41.1 GAD (GENERALIZED ANXIETY DISORDER): ICD-10-CM

## 2017-06-22 RX ORDER — LORAZEPAM 1 MG/1
1 TABLET ORAL 2 TIMES DAILY PRN
Qty: 60 TABLET | Refills: 0 | Status: SHIPPED | OUTPATIENT
Start: 2017-06-22 | End: 2017-11-29 | Stop reason: SDUPTHER

## 2017-11-29 ENCOUNTER — OFFICE VISIT (OUTPATIENT)
Dept: PRIMARY CARE CLINIC | Age: 42
End: 2017-11-29
Payer: COMMERCIAL

## 2017-11-29 ENCOUNTER — TELEPHONE (OUTPATIENT)
Dept: PRIMARY CARE CLINIC | Age: 42
End: 2017-11-29

## 2017-11-29 VITALS
OXYGEN SATURATION: 98 % | HEIGHT: 72 IN | DIASTOLIC BLOOD PRESSURE: 74 MMHG | SYSTOLIC BLOOD PRESSURE: 118 MMHG | BODY MASS INDEX: 25.47 KG/M2 | HEART RATE: 84 BPM | TEMPERATURE: 98.4 F | WEIGHT: 188 LBS

## 2017-11-29 DIAGNOSIS — E29.1 HYPOGONADISM IN MALE: ICD-10-CM

## 2017-11-29 DIAGNOSIS — J01.10 ACUTE NON-RECURRENT FRONTAL SINUSITIS: Primary | ICD-10-CM

## 2017-11-29 DIAGNOSIS — F51.01 PRIMARY INSOMNIA: ICD-10-CM

## 2017-11-29 DIAGNOSIS — F41.1 GAD (GENERALIZED ANXIETY DISORDER): ICD-10-CM

## 2017-11-29 LAB
BASOPHILS ABSOLUTE: 0 K/UL (ref 0–0.2)
BASOPHILS RELATIVE PERCENT: 0.3 % (ref 0–1)
EOSINOPHILS ABSOLUTE: 0.1 K/UL (ref 0–0.6)
EOSINOPHILS RELATIVE PERCENT: 0.6 % (ref 0–5)
HCT VFR BLD CALC: 47.3 % (ref 42–52)
HEMOGLOBIN: 16.2 G/DL (ref 14–18)
LYMPHOCYTES ABSOLUTE: 1.7 K/UL (ref 1.1–4.5)
LYMPHOCYTES RELATIVE PERCENT: 17.7 % (ref 20–40)
MCH RBC QN AUTO: 29.1 PG (ref 27–31)
MCHC RBC AUTO-ENTMCNC: 34.2 G/DL (ref 33–37)
MCV RBC AUTO: 85.1 FL (ref 80–94)
MONOCYTES ABSOLUTE: 0.6 K/UL (ref 0–0.9)
MONOCYTES RELATIVE PERCENT: 6.7 % (ref 0–10)
NEUTROPHILS ABSOLUTE: 7.1 K/UL (ref 1.5–7.5)
NEUTROPHILS RELATIVE PERCENT: 74.4 % (ref 50–65)
PDW BLD-RTO: 12.8 % (ref 11.5–14.5)
PLATELET # BLD: 218 K/UL (ref 130–400)
PMV BLD AUTO: 9 FL (ref 9.4–12.4)
PROSTATE SPECIFIC ANTIGEN: 1.68 NG/ML (ref 0–4)
RBC # BLD: 5.56 M/UL (ref 4.7–6.1)
WBC # BLD: 9.5 K/UL (ref 4.8–10.8)

## 2017-11-29 PROCEDURE — 99213 OFFICE O/P EST LOW 20 MIN: CPT | Performed by: NURSE PRACTITIONER

## 2017-11-29 RX ORDER — PSEUDOEPHEDRINE HYDROCHLORIDE 30 MG/1
60 TABLET ORAL EVERY 6 HOURS PRN
Qty: 48 TABLET | Refills: 0 | Status: SHIPPED | OUTPATIENT
Start: 2017-11-29 | End: 2018-04-18

## 2017-11-29 RX ORDER — LORAZEPAM 1 MG/1
1 TABLET ORAL 2 TIMES DAILY PRN
Qty: 60 TABLET | Refills: 0 | Status: SHIPPED | OUTPATIENT
Start: 2017-11-29 | End: 2018-04-18 | Stop reason: SDUPTHER

## 2017-11-29 RX ORDER — CLOTRIMAZOLE 10 MG/1
10 LOZENGE ORAL; TOPICAL
Qty: 50 TABLET | Refills: 0 | Status: SHIPPED | OUTPATIENT
Start: 2017-11-29 | End: 2017-12-09

## 2017-11-29 RX ORDER — TESTOSTERONE CYPIONATE 200 MG/ML
INJECTION INTRAMUSCULAR
Qty: 2 ML | Refills: 1 | Status: SHIPPED | OUTPATIENT
Start: 2017-11-29 | End: 2018-02-14 | Stop reason: SDUPTHER

## 2017-11-29 RX ORDER — AMOXICILLIN AND CLAVULANATE POTASSIUM 875; 125 MG/1; MG/1
1 TABLET, FILM COATED ORAL 2 TIMES DAILY
Qty: 20 TABLET | Refills: 0 | Status: SHIPPED | OUTPATIENT
Start: 2017-11-29 | End: 2017-12-09

## 2017-11-29 ASSESSMENT — ENCOUNTER SYMPTOMS
SORE THROAT: 0
DIARRHEA: 0
SINUS PAIN: 1
CONSTIPATION: 0
COUGH: 1
RHINORRHEA: 1
ABDOMINAL PAIN: 0
VOMITING: 0
NAUSEA: 0
TROUBLE SWALLOWING: 0
SHORTNESS OF BREATH: 0

## 2017-11-29 NOTE — PROGRESS NOTES
[facility] [address]  [facility], [address] [place], [place] [address]  Phone [phone]   Fax [phone]      OFFICE VISIT: 2017    Taina Mc- : 1975        Reason For Visit:  Randall Johnson is a 38 y.o. male who is here for Anxiety (here for medication refill ) and Sinus Problem (having frontal headache, pain, congestion. started monday)         HPI    Pt is here for Anxiety and sinus congestion    Anxiety   Takes Ativan routinely 1/2 pill twice daily   Reports increase in physical activity and diet  Working well     Congestion  Has had congestion for a couple of week  Monday morning noticed increased facial pain   Has been using tylenol and ibuprofen and helps pain but no other relief  Relieved with hot compresses  Post-nasal drip, rhinorrhea, and cough  Denies dental pain, fever, chills, or body aches  Is constant   Reports use of Afrin and saline flushes routinely in fall and spring due to allergies     Roof of mouth has been sore and rough like it has been burnt. This has been a problem for a few weeks. height is 6' (1.829 m) and weight is 188 lb (85.3 kg). His temporal temperature is 98.4 °F (36.9 °C). His blood pressure is 118/74 and his pulse is 84. His oxygen saturation is 98%. Body mass index is 25.5 kg/m². Results for orders placed or performed in visit on 17   POCT rapid strep A   Result Value Ref Range    Strep A Ag None Detected None Detected       I have reviewed the following with the Mr. Coley   Lab Review   Office Visit on 2017   Component Date Value    Strep A Ag 2017 None Detected      Copies of these are in the chart. Prior to Visit Medications    Medication Sig Taking? Authorizing Provider   LORazepam (ATIVAN) 1 MG tablet Take 1 tablet by mouth 2 times daily as needed for Anxiety . Yes SAM Li   testosterone cypionate (DEPOTESTOTERONE CYPIONATE) 200 MG/ML injection Inject 1 mL q 2 weeks (syringes and needles).  Yes SAM Li amoxicillin-clavulanate (AUGMENTIN) 875-125 MG per tablet Take 1 tablet by mouth 2 times daily for 10 days Yes SAM Noble   pseudoephedrine (DECONGESTANT) 30 MG tablet Take 2 tablets by mouth every 6 hours as needed for Congestion Yes SAM Noble   clotrimazole (MYCELEX) 10 MG enrique Take 1 tablet by mouth 5 times daily for 10 days Yes SAM Noble   pantoprazole (PROTONIX) 40 MG tablet  Yes Historical Provider, MD   atorvastatin (LIPITOR) 20 MG tablet Take 1 tablet by mouth daily Yes SAM Quintana   fluticasone (FLONASE) 50 MCG/ACT nasal spray 2 sprays by Nasal route daily Yes Historical Provider, MD PINTO 3CC LUER-VIOLA SYR 23GX1\" 23G X 1\" 3 ML MISC USE WITH TESTOSTERONE INJECTION AS DIRECTED Yes LACEY Mendoza,    aspirin 81 MG tablet Take 81 mg by mouth daily Yes Historical Provider, MD   Multiple Vitamins-Minerals (THERAPEUTIC MULTIVITAMIN-MINERALS) tablet Take 1 tablet by mouth daily Yes Historical Provider, MD       Allergies: Review of patient's allergies indicates no known allergies. Past Medical History:   Diagnosis Date    GERD (gastroesophageal reflux disease)     Hyperlipidemia        Past Surgical History:   Procedure Laterality Date    VASECTOMY Bilateral 11/2016       Social History   Substance Use Topics    Smoking status: Current Every Day Smoker     Packs/day: 0.50     Years: 19.00     Types: Cigarettes    Smokeless tobacco: Never Used    Alcohol use Yes      Comment: rare       Review of Systems   Constitutional: Negative for activity change, appetite change, fatigue, fever and unexpected weight change. HENT: Positive for postnasal drip, rhinorrhea and sinus pain. Negative for ear pain, sore throat and trouble swallowing. Eyes: Negative for visual disturbance. Respiratory: Positive for cough. Negative for shortness of breath. Cardiovascular: Negative for chest pain, palpitations and leg swelling. Gastrointestinal: Negative for abdominal pain, constipation, diarrhea, nausea and vomiting. Genitourinary: Negative for flank pain. Musculoskeletal: Negative for arthralgias, myalgias, neck pain and neck stiffness. Neurological: Negative for headaches. Psychiatric/Behavioral: Negative for decreased concentration and sleep disturbance. The patient is not nervous/anxious (controlled with Ativan). Physical Exam   Constitutional: He is oriented to person, place, and time. Vital signs are normal. He appears well-developed and well-nourished. HENT:   Head: Normocephalic and atraumatic. Right Ear: Tympanic membrane, external ear and ear canal normal.   Left Ear: Tympanic membrane, external ear and ear canal normal.   Nose: Right sinus exhibits frontal sinus tenderness. Right sinus exhibits no maxillary sinus tenderness. Left sinus exhibits no maxillary sinus tenderness and no frontal sinus tenderness. Mouth/Throat: Posterior oropharyngeal erythema present. Eyes: Conjunctivae are normal. Pupils are equal, round, and reactive to light. No scleral icterus. Neck: Normal range of motion. Neck supple. No edema present. Cardiovascular: Normal rate, regular rhythm, normal heart sounds and intact distal pulses. No murmur heard. Pulmonary/Chest: Effort normal and breath sounds normal.   Abdominal: Soft. Normal appearance and bowel sounds are normal. He exhibits no distension. There is no hepatosplenomegaly. There is no tenderness. Musculoskeletal: Normal range of motion. He exhibits no edema. Lymphadenopathy:     He has no cervical adenopathy. Neurological: He is alert and oriented to person, place, and time. Skin: Skin is warm, dry and intact. No rash noted. Psychiatric: He has a normal mood and affect. His speech is normal and behavior is normal. Judgment and thought content normal.   Vitals reviewed.

## 2017-12-01 LAB
SEX HORMONE BINDING GLOBULIN: 28 NMOL/L (ref 11–80)
TESTOSTERONE FREE PERCENT: 2.1 % (ref 1.6–2.9)
TESTOSTERONE FREE, CALC: 99 PG/ML (ref 47–244)
TESTOSTERONE TOTAL-MALE: 483 NG/DL (ref 300–890)

## 2017-12-04 ENCOUNTER — TELEPHONE (OUTPATIENT)
Dept: PRIMARY CARE CLINIC | Age: 42
End: 2017-12-04

## 2017-12-04 NOTE — TELEPHONE ENCOUNTER
----- Message from SAM March sent at 12/2/2017 12:33 AM CST -----  Please call patient and let them know results. Normal testosterone.  Continue injections at current interval and dosage

## 2018-02-14 DIAGNOSIS — E29.1 HYPOGONADISM IN MALE: ICD-10-CM

## 2018-02-14 RX ORDER — TESTOSTERONE CYPIONATE 200 MG/ML
INJECTION INTRAMUSCULAR
Qty: 10 ML | Refills: 0 | Status: SHIPPED | OUTPATIENT
Start: 2018-02-14 | End: 2018-04-18 | Stop reason: SDUPTHER

## 2018-04-18 ENCOUNTER — OFFICE VISIT (OUTPATIENT)
Dept: PRIMARY CARE CLINIC | Age: 43
End: 2018-04-18
Payer: COMMERCIAL

## 2018-04-18 VITALS
DIASTOLIC BLOOD PRESSURE: 80 MMHG | OXYGEN SATURATION: 98 % | BODY MASS INDEX: 24.41 KG/M2 | WEIGHT: 180 LBS | SYSTOLIC BLOOD PRESSURE: 138 MMHG | TEMPERATURE: 98 F | HEART RATE: 87 BPM

## 2018-04-18 DIAGNOSIS — F41.1 GAD (GENERALIZED ANXIETY DISORDER): ICD-10-CM

## 2018-04-18 DIAGNOSIS — E29.1 HYPOGONADISM IN MALE: Primary | ICD-10-CM

## 2018-04-18 DIAGNOSIS — K13.79 ORAL PAIN: ICD-10-CM

## 2018-04-18 DIAGNOSIS — F51.01 PRIMARY INSOMNIA: ICD-10-CM

## 2018-04-18 PROCEDURE — 99214 OFFICE O/P EST MOD 30 MIN: CPT | Performed by: NURSE PRACTITIONER

## 2018-04-18 RX ORDER — TESTOSTERONE CYPIONATE 200 MG/ML
INJECTION INTRAMUSCULAR
Qty: 10 ML | Refills: 0 | Status: SHIPPED | OUTPATIENT
Start: 2018-04-18 | End: 2018-07-12 | Stop reason: SDUPTHER

## 2018-04-18 RX ORDER — LORAZEPAM 1 MG/1
1 TABLET ORAL 2 TIMES DAILY PRN
Qty: 60 TABLET | Refills: 0 | Status: SHIPPED | OUTPATIENT
Start: 2018-04-18 | End: 2018-05-18

## 2018-04-18 RX ORDER — TESTOSTERONE CYPIONATE 200 MG/ML
INJECTION INTRAMUSCULAR
Qty: 10 ML | Refills: 0 | Status: CANCELLED | OUTPATIENT
Start: 2018-04-18 | End: 2018-06-20

## 2018-04-18 ASSESSMENT — ENCOUNTER SYMPTOMS
RHINORRHEA: 0
SHORTNESS OF BREATH: 0
NAUSEA: 0
COUGH: 0
CONSTIPATION: 0
VOMITING: 0
SORE THROAT: 0
TROUBLE SWALLOWING: 0
ABDOMINAL PAIN: 0
DIARRHEA: 0

## 2018-04-18 ASSESSMENT — PATIENT HEALTH QUESTIONNAIRE - PHQ9
SUM OF ALL RESPONSES TO PHQ QUESTIONS 1-9: 0
1. LITTLE INTEREST OR PLEASURE IN DOING THINGS: 0
2. FEELING DOWN, DEPRESSED OR HOPELESS: 0
SUM OF ALL RESPONSES TO PHQ9 QUESTIONS 1 & 2: 0

## 2018-05-04 ENCOUNTER — TELEPHONE (OUTPATIENT)
Dept: PRIMARY CARE CLINIC | Age: 43
End: 2018-05-04

## 2018-05-04 RX ORDER — AZITHROMYCIN 250 MG/1
TABLET, FILM COATED ORAL
Qty: 1 PACKET | Refills: 0 | Status: SHIPPED | OUTPATIENT
Start: 2018-05-04 | End: 2018-05-08

## 2018-07-12 ENCOUNTER — OFFICE VISIT (OUTPATIENT)
Dept: PRIMARY CARE CLINIC | Age: 43
End: 2018-07-12
Payer: COMMERCIAL

## 2018-07-12 VITALS
DIASTOLIC BLOOD PRESSURE: 96 MMHG | OXYGEN SATURATION: 98 % | HEART RATE: 74 BPM | WEIGHT: 184 LBS | BODY MASS INDEX: 24.92 KG/M2 | TEMPERATURE: 97.3 F | HEIGHT: 72 IN | SYSTOLIC BLOOD PRESSURE: 137 MMHG

## 2018-07-12 DIAGNOSIS — Z72.51 HIGH RISK SEXUAL BEHAVIOR: ICD-10-CM

## 2018-07-12 DIAGNOSIS — K12.1 STOMATITIS: ICD-10-CM

## 2018-07-12 DIAGNOSIS — F41.1 GAD (GENERALIZED ANXIETY DISORDER): ICD-10-CM

## 2018-07-12 DIAGNOSIS — E29.1 HYPOGONADISM IN MALE: ICD-10-CM

## 2018-07-12 DIAGNOSIS — K12.1 STOMATITIS: Primary | ICD-10-CM

## 2018-07-12 LAB — RAPID HIV 1&2: NORMAL

## 2018-07-12 PROCEDURE — 99214 OFFICE O/P EST MOD 30 MIN: CPT | Performed by: NURSE PRACTITIONER

## 2018-07-12 RX ORDER — LORAZEPAM 1 MG/1
1 TABLET ORAL 2 TIMES DAILY PRN
COMMUNITY
End: 2018-07-12 | Stop reason: SDUPTHER

## 2018-07-12 RX ORDER — LORAZEPAM 1 MG/1
1 TABLET ORAL 2 TIMES DAILY PRN
Qty: 60 TABLET | Refills: 0 | Status: SHIPPED | OUTPATIENT
Start: 2018-07-12 | End: 2018-08-11

## 2018-07-12 RX ORDER — BETAMETHASONE DIPROPIONATE 0.05 %
OINTMENT (GRAM) TOPICAL
Qty: 50 G | Refills: 0 | Status: SHIPPED | OUTPATIENT
Start: 2018-07-12 | End: 2019-02-11

## 2018-07-12 RX ORDER — TESTOSTERONE CYPIONATE 200 MG/ML
INJECTION INTRAMUSCULAR
Qty: 10 ML | Refills: 0 | Status: SHIPPED | OUTPATIENT
Start: 2018-07-12 | End: 2021-06-30

## 2018-07-12 ASSESSMENT — ENCOUNTER SYMPTOMS
VOMITING: 0
DIARRHEA: 0
RHINORRHEA: 0
SHORTNESS OF BREATH: 0
SORE THROAT: 0
NAUSEA: 0
CONSTIPATION: 0
ABDOMINAL PAIN: 0
TROUBLE SWALLOWING: 0
COUGH: 0

## 2018-07-12 NOTE — PROGRESS NOTES
murmur heard. Pulmonary/Chest: Effort normal and breath sounds normal.   Abdominal: Soft. Normal appearance and bowel sounds are normal. He exhibits no distension. There is no hepatosplenomegaly. There is no tenderness. There is no rebound and no guarding. Musculoskeletal: Normal range of motion. He exhibits no edema. Neurological: He is alert and oriented to person, place, and time. Skin: Skin is warm, dry and intact. No rash noted. Psychiatric: He has a normal mood and affect. His speech is normal and behavior is normal. Judgment and thought content normal.   Vitals reviewed. BP (!) 137/96   Pulse 74   Temp 97.3 °F (36.3 °C) (Temporal)   Ht 6' (1.829 m)   Wt 184 lb (83.5 kg)   SpO2 98%   BMI 24.95 kg/m²     Assessment:        ICD-10-CM ICD-9-CM    1. Stomatitis K12.1 528.00 Culture, Viral Respiratory   2. Hypogonadism in male E29.1 257.2 testosterone cypionate (DEPOTESTOTERONE CYPIONATE) 200 MG/ML injection   3. SELENA (generalized anxiety disorder) F41.1 300.02 LORazepam (ATIVAN) 1 MG tablet   4. High risk sexual behavior Z72.51 V69.2 Herpes Simplex Virus (HSV) I/II Antibodies IgG & IgM      HIV Rapid 1&2      Hepatitis C Antibody       Plan:      Return if symptoms worsen or fail to improve. Orders Placed This Encounter   Procedures    Culture, Viral Respiratory     oral     Standing Status:   Future     Standing Expiration Date:   7/12/2019    Herpes Simplex Virus (HSV) I/II Antibodies IgG & IgM     Standing Status:   Future     Number of Occurrences:   1     Standing Expiration Date:   7/12/2019    HIV Rapid 1&2     Standing Status:   Future     Number of Occurrences:   1     Standing Expiration Date:   7/12/2019     Order Specific Question:   Specify Date & Time of Incident     Answer:   .     Hepatitis C Antibody     Standing Status:   Future     Number of Occurrences:   1     Standing Expiration Date:   7/12/2019     Orders Placed This Encounter   Medications    LORazepam (ATIVAN) 1 MG tablet     Sig: Take 1 tablet by mouth 2 times daily as needed for Anxiety for up to 30 days. .     Dispense:  60 tablet     Refill:  0    testosterone cypionate (DEPOTESTOTERONE CYPIONATE) 200 MG/ML injection     Sig: Inject 1 mL q 2 weeks (syringes and needles). Dispense:  10 mL     Refill:  0    betamethasone dipropionate (DIPROLENE) 0.05 % ointment     Sig: Apply topically twice daily for a week. Dispense:  50 g     Refill:  0         Discussed use, benefit, and side effects of prescribed medications. All patient questions answered. Pt voiced understanding. Reviewed health maintenance. .  Patient agreed with treatment plan. Follow up as directed. Controlled Substances Monitoring:     RX Monitoring 7/12/2018   Attestation The Prescription Monitoring Report for this patient was reviewed today. Documentation Possible medication side effects, risk of tolerance/dependence & alternative treatments discussed. ;No signs of potential drug abuse or diversion identified. There are no Patient Instructions on file for this visit.       Electronically signed by SAM Mujica on 7/12/2018 at 12:19 PM

## 2018-07-13 ENCOUNTER — TELEPHONE (OUTPATIENT)
Dept: PRIMARY CARE CLINIC | Age: 43
End: 2018-07-13

## 2018-07-13 LAB — HEPATITIS C ANTIBODY INTERPRETATION: NORMAL

## 2018-07-13 NOTE — TELEPHONE ENCOUNTER
----- Message from SAM Cuello sent at 7/12/2018  3:51 PM CDT -----  Please call patient and let them know results.    Negative HIV screening

## 2018-07-15 LAB
HERPES TYPE 1/2 IGM COMBINED: 0.54 IV
HERPES TYPE I/II IGG COMBINED: 0.86 IV

## 2018-07-16 ENCOUNTER — TELEPHONE (OUTPATIENT)
Dept: PRIMARY CARE CLINIC | Age: 43
End: 2018-07-16

## 2018-07-16 NOTE — TELEPHONE ENCOUNTER
----- Message from SAM Gomez sent at 7/16/2018  7:08 AM CDT -----  Please call patient and let them know results.    Negative viral culture

## 2018-07-16 NOTE — TELEPHONE ENCOUNTER
Left message for patient to return call at convenience. SAM [facility] [address] [place] Clinical Staff             Please call patient and let them know results.    Herpes and hepatitis C screenings are negative

## 2018-07-25 LAB
FINAL REPORT: NORMAL
PRELIMINARY: NORMAL

## 2019-02-11 ENCOUNTER — OFFICE VISIT (OUTPATIENT)
Dept: PRIMARY CARE CLINIC | Age: 44
End: 2019-02-11
Payer: COMMERCIAL

## 2019-02-11 ENCOUNTER — TELEPHONE (OUTPATIENT)
Dept: PRIMARY CARE CLINIC | Age: 44
End: 2019-02-11

## 2019-02-11 VITALS
HEIGHT: 72 IN | HEART RATE: 98 BPM | WEIGHT: 190 LBS | SYSTOLIC BLOOD PRESSURE: 139 MMHG | DIASTOLIC BLOOD PRESSURE: 87 MMHG | OXYGEN SATURATION: 97 % | BODY MASS INDEX: 25.73 KG/M2 | TEMPERATURE: 97.8 F

## 2019-02-11 DIAGNOSIS — Z00.00 ROUTINE PHYSICAL EXAMINATION: ICD-10-CM

## 2019-02-11 DIAGNOSIS — F41.1 GAD (GENERALIZED ANXIETY DISORDER): Primary | ICD-10-CM

## 2019-02-11 DIAGNOSIS — E29.1 HYPOGONADISM IN MALE: ICD-10-CM

## 2019-02-11 DIAGNOSIS — F51.01 PRIMARY INSOMNIA: ICD-10-CM

## 2019-02-11 DIAGNOSIS — L98.9 BENIGN SKIN LESION OF FOREHEAD: ICD-10-CM

## 2019-02-11 DIAGNOSIS — E78.2 MIXED HYPERLIPIDEMIA: ICD-10-CM

## 2019-02-11 LAB
ALBUMIN SERPL-MCNC: 5 G/DL (ref 3.5–5.2)
ALP BLD-CCNC: 82 U/L (ref 40–130)
ALT SERPL-CCNC: 34 U/L (ref 5–41)
ANION GAP SERPL CALCULATED.3IONS-SCNC: 17 MMOL/L (ref 7–19)
AST SERPL-CCNC: 19 U/L (ref 5–40)
BASOPHILS ABSOLUTE: 0 K/UL (ref 0–0.2)
BASOPHILS RELATIVE PERCENT: 0.5 % (ref 0–1)
BILIRUB SERPL-MCNC: 0.4 MG/DL (ref 0.2–1.2)
BUN BLDV-MCNC: 15 MG/DL (ref 6–20)
CALCIUM SERPL-MCNC: 9.7 MG/DL (ref 8.6–10)
CHLORIDE BLD-SCNC: 101 MMOL/L (ref 98–111)
CHOLESTEROL, TOTAL: 237 MG/DL (ref 160–199)
CO2: 25 MMOL/L (ref 22–29)
CREAT SERPL-MCNC: 1 MG/DL (ref 0.5–1.2)
EOSINOPHILS ABSOLUTE: 0.1 K/UL (ref 0–0.6)
EOSINOPHILS RELATIVE PERCENT: 1.3 % (ref 0–5)
GFR NON-AFRICAN AMERICAN: >60
GLUCOSE BLD-MCNC: 99 MG/DL (ref 74–109)
HCT VFR BLD CALC: 49.7 % (ref 42–52)
HDLC SERPL-MCNC: 34 MG/DL (ref 55–121)
HEMOGLOBIN: 16.9 G/DL (ref 14–18)
LDL CHOLESTEROL CALCULATED: 148 MG/DL
LYMPHOCYTES ABSOLUTE: 2 K/UL (ref 1.1–4.5)
LYMPHOCYTES RELATIVE PERCENT: 34.2 % (ref 20–40)
MCH RBC QN AUTO: 29.3 PG (ref 27–31)
MCHC RBC AUTO-ENTMCNC: 34 G/DL (ref 33–37)
MCV RBC AUTO: 86.3 FL (ref 80–94)
MONOCYTES ABSOLUTE: 0.5 K/UL (ref 0–0.9)
MONOCYTES RELATIVE PERCENT: 8.3 % (ref 0–10)
NEUTROPHILS ABSOLUTE: 3.3 K/UL (ref 1.5–7.5)
NEUTROPHILS RELATIVE PERCENT: 55 % (ref 50–65)
PDW BLD-RTO: 12.5 % (ref 11.5–14.5)
PLATELET # BLD: 270 K/UL (ref 130–400)
PMV BLD AUTO: 9.1 FL (ref 9.4–12.4)
POTASSIUM SERPL-SCNC: 4.7 MMOL/L (ref 3.5–5)
PROSTATE SPECIFIC ANTIGEN: 1.86 NG/ML (ref 0–4)
RBC # BLD: 5.76 M/UL (ref 4.7–6.1)
SODIUM BLD-SCNC: 143 MMOL/L (ref 136–145)
TOTAL PROTEIN: 7.7 G/DL (ref 6.6–8.7)
TRIGL SERPL-MCNC: 275 MG/DL (ref 0–149)
WBC # BLD: 5.9 K/UL (ref 4.8–10.8)

## 2019-02-11 PROCEDURE — 99214 OFFICE O/P EST MOD 30 MIN: CPT | Performed by: NURSE PRACTITIONER

## 2019-02-11 PROCEDURE — 17110 DESTRUCTION B9 LES UP TO 14: CPT | Performed by: NURSE PRACTITIONER

## 2019-02-11 RX ORDER — LORAZEPAM 1 MG/1
1 TABLET ORAL 3 TIMES DAILY PRN
Qty: 90 TABLET | Refills: 0 | Status: SHIPPED | OUTPATIENT
Start: 2019-02-11 | End: 2021-06-30

## 2019-02-11 RX ORDER — ATORVASTATIN CALCIUM 40 MG/1
40 TABLET, FILM COATED ORAL DAILY
Qty: 30 TABLET | Refills: 3 | Status: SHIPPED | OUTPATIENT
Start: 2019-02-11 | End: 2020-03-04

## 2019-02-11 RX ORDER — LORAZEPAM 1 MG/1
1 TABLET ORAL 2 TIMES DAILY PRN
Qty: 60 TABLET | Refills: 0 | Status: CANCELLED | OUTPATIENT
Start: 2019-02-11 | End: 2019-03-13

## 2019-02-11 ASSESSMENT — PATIENT HEALTH QUESTIONNAIRE - PHQ9
SUM OF ALL RESPONSES TO PHQ QUESTIONS 1-9: 0
SUM OF ALL RESPONSES TO PHQ QUESTIONS 1-9: 0
1. LITTLE INTEREST OR PLEASURE IN DOING THINGS: 0
SUM OF ALL RESPONSES TO PHQ9 QUESTIONS 1 & 2: 0
2. FEELING DOWN, DEPRESSED OR HOPELESS: 0

## 2019-02-11 ASSESSMENT — ENCOUNTER SYMPTOMS
COUGH: 0
VOMITING: 0
NAUSEA: 0
CONSTIPATION: 0
SORE THROAT: 0
TROUBLE SWALLOWING: 0
ABDOMINAL PAIN: 0
DIARRHEA: 0
RHINORRHEA: 0
SHORTNESS OF BREATH: 0

## 2019-02-12 LAB
SEX HORMONE BINDING GLOBULIN: 30 NMOL/L (ref 11–80)
TESTOSTERONE FREE-NONMALE: 224 PG/ML (ref 47–244)
TESTOSTERONE TOTAL: 903 NG/DL (ref 220–1000)

## 2019-02-13 ENCOUNTER — TELEPHONE (OUTPATIENT)
Dept: PRIMARY CARE CLINIC | Age: 44
End: 2019-02-13

## 2019-02-18 ENCOUNTER — TELEPHONE (OUTPATIENT)
Dept: PRIMARY CARE CLINIC | Age: 44
End: 2019-02-18

## 2019-03-28 ENCOUNTER — OFFICE VISIT (OUTPATIENT)
Dept: PRIMARY CARE CLINIC | Age: 44
End: 2019-03-28
Payer: COMMERCIAL

## 2019-03-28 VITALS
OXYGEN SATURATION: 97 % | BODY MASS INDEX: 25.47 KG/M2 | DIASTOLIC BLOOD PRESSURE: 81 MMHG | HEIGHT: 72 IN | HEART RATE: 99 BPM | WEIGHT: 188 LBS | SYSTOLIC BLOOD PRESSURE: 130 MMHG | TEMPERATURE: 97.8 F

## 2019-03-28 DIAGNOSIS — J00 ACUTE NASOPHARYNGITIS: ICD-10-CM

## 2019-03-28 DIAGNOSIS — Z20.818 EXPOSURE TO STREP THROAT: Primary | ICD-10-CM

## 2019-03-28 PROCEDURE — 99213 OFFICE O/P EST LOW 20 MIN: CPT | Performed by: NURSE PRACTITIONER

## 2019-03-28 PROCEDURE — 87880 STREP A ASSAY W/OPTIC: CPT | Performed by: NURSE PRACTITIONER

## 2019-03-28 RX ORDER — PSEUDOEPHEDRINE HYDROCHLORIDE 30 MG/1
60 TABLET ORAL EVERY 6 HOURS PRN
Qty: 48 TABLET | Refills: 0 | Status: SHIPPED | OUTPATIENT
Start: 2019-03-28 | End: 2020-01-17

## 2019-03-28 RX ORDER — AZITHROMYCIN 250 MG/1
250 TABLET, FILM COATED ORAL SEE ADMIN INSTRUCTIONS
Qty: 6 TABLET | Refills: 0 | Status: SHIPPED | OUTPATIENT
Start: 2019-03-28 | End: 2019-04-02

## 2019-03-28 ASSESSMENT — ENCOUNTER SYMPTOMS
SORE THROAT: 1
TROUBLE SWALLOWING: 0
VOMITING: 0
CONSTIPATION: 0
DIARRHEA: 0
NAUSEA: 0
COUGH: 1
RHINORRHEA: 0
SHORTNESS OF BREATH: 0
ABDOMINAL PAIN: 0

## 2020-01-17 ENCOUNTER — OFFICE VISIT (OUTPATIENT)
Dept: PRIMARY CARE CLINIC | Age: 45
End: 2020-01-17
Payer: COMMERCIAL

## 2020-01-17 VITALS
BODY MASS INDEX: 26.55 KG/M2 | OXYGEN SATURATION: 99 % | WEIGHT: 196 LBS | SYSTOLIC BLOOD PRESSURE: 120 MMHG | HEART RATE: 100 BPM | HEIGHT: 72 IN | TEMPERATURE: 98.5 F | DIASTOLIC BLOOD PRESSURE: 82 MMHG

## 2020-01-17 PROCEDURE — 99213 OFFICE O/P EST LOW 20 MIN: CPT | Performed by: NURSE PRACTITIONER

## 2020-01-17 RX ORDER — FLUTICASONE PROPIONATE 50 MCG
SPRAY, SUSPENSION (ML) NASAL
Qty: 1 BOTTLE | Refills: 11 | Status: SHIPPED | OUTPATIENT
Start: 2020-01-17 | End: 2021-06-30

## 2020-01-17 RX ORDER — AMOXICILLIN AND CLAVULANATE POTASSIUM 875; 125 MG/1; MG/1
1 TABLET, FILM COATED ORAL 2 TIMES DAILY
Qty: 20 TABLET | Refills: 0 | Status: SHIPPED | OUTPATIENT
Start: 2020-01-17 | End: 2020-01-27

## 2020-01-17 ASSESSMENT — ENCOUNTER SYMPTOMS
HOARSE VOICE: 1
CHOKING: 0
WHEEZING: 0
EYE REDNESS: 0
DIARRHEA: 0
COUGH: 1
CONSTIPATION: 0
SINUS PRESSURE: 1
RHINORRHEA: 1
SORE THROAT: 0
BLOOD IN STOOL: 0
EYE DISCHARGE: 0

## 2020-03-04 RX ORDER — ATORVASTATIN CALCIUM 40 MG/1
TABLET, FILM COATED ORAL
Qty: 90 TABLET | Refills: 2 | Status: SHIPPED | OUTPATIENT
Start: 2020-03-04 | End: 2021-06-30

## 2020-03-04 NOTE — TELEPHONE ENCOUNTER
Pt lynn 1/17/20.       Requested Prescriptions     Pending Prescriptions Disp Refills    atorvastatin (LIPITOR) 40 MG tablet [Pharmacy Med Name: ATORVASTATIN 40 MG TABLET 40 TAB] 90 tablet 2     Sig: TAKE ONE TABLET BY MOUTH DAILY

## 2020-11-18 ENCOUNTER — E-VISIT (OUTPATIENT)
Dept: PRIMARY CARE CLINIC | Age: 45
End: 2020-11-18
Payer: COMMERCIAL

## 2020-11-18 PROCEDURE — 99422 OL DIG E/M SVC 11-20 MIN: CPT | Performed by: NURSE PRACTITIONER

## 2020-11-18 RX ORDER — AMOXICILLIN 875 MG/1
875 TABLET, COATED ORAL 2 TIMES DAILY
Qty: 20 TABLET | Refills: 0 | Status: SHIPPED | OUTPATIENT
Start: 2020-11-18 | End: 2020-11-28

## 2020-11-18 ASSESSMENT — LIFESTYLE VARIABLES
SMOKING_YEARS: 25
SMOKING_STATUS: YES

## 2020-11-18 NOTE — PROGRESS NOTES
HPI: as per patient provided history He reports some coughing with thin yellow/green mucus. He denies fever and is a smoker. He has been using throat spray and tylenol. He states he thinks he may have strep phargitis. He has taken Amoxil for this in the past.   Exam: N/A (electronic visit)  ASSESSMENT/PLAN:  1. Acute bacterial pharyngitis    - amoxicillin (AMOXIL) 875 MG tablet; Take 1 tablet by mouth 2 times daily for 10 days  Dispense: 20 tablet; Refill: 0      Patient instructed to call the office if worsens, or fails to improve as anticipated. 11-20 minutes were spent on the digital evaluation and management of this patient.

## 2021-06-30 ENCOUNTER — OFFICE VISIT (OUTPATIENT)
Dept: PRIMARY CARE CLINIC | Age: 46
End: 2021-06-30
Payer: COMMERCIAL

## 2021-06-30 VITALS
OXYGEN SATURATION: 98 % | HEART RATE: 91 BPM | WEIGHT: 188.38 LBS | BODY MASS INDEX: 25.52 KG/M2 | HEIGHT: 72 IN | DIASTOLIC BLOOD PRESSURE: 88 MMHG | TEMPERATURE: 98.5 F | SYSTOLIC BLOOD PRESSURE: 130 MMHG

## 2021-06-30 DIAGNOSIS — J30.1 SEASONAL ALLERGIC RHINITIS DUE TO POLLEN: Primary | ICD-10-CM

## 2021-06-30 DIAGNOSIS — L81.9 PIGMENTED SKIN LESION OF UNCERTAIN BEHAVIOR OF HEAD: ICD-10-CM

## 2021-06-30 DIAGNOSIS — Z72.0 TOBACCO ABUSE DISORDER: ICD-10-CM

## 2021-06-30 PROCEDURE — 99213 OFFICE O/P EST LOW 20 MIN: CPT | Performed by: NURSE PRACTITIONER

## 2021-06-30 RX ORDER — LEVOCETIRIZINE DIHYDROCHLORIDE 5 MG/1
5 TABLET, FILM COATED ORAL NIGHTLY
Qty: 30 TABLET | Refills: 5 | Status: SHIPPED | OUTPATIENT
Start: 2021-06-30 | End: 2021-07-22

## 2021-06-30 RX ORDER — GUAIFENESIN 600 MG/1
1200 TABLET, EXTENDED RELEASE ORAL 2 TIMES DAILY
Qty: 40 TABLET | Refills: 0 | Status: SHIPPED | OUTPATIENT
Start: 2021-06-30 | End: 2021-07-10

## 2021-06-30 RX ORDER — AZITHROMYCIN 250 MG/1
250 TABLET, FILM COATED ORAL SEE ADMIN INSTRUCTIONS
Qty: 6 TABLET | Refills: 0 | Status: SHIPPED | OUTPATIENT
Start: 2021-06-30 | End: 2021-07-05

## 2021-06-30 ASSESSMENT — ENCOUNTER SYMPTOMS
COUGH: 1
DIARRHEA: 0
NAUSEA: 0
SORE THROAT: 0
VOMITING: 0
SHORTNESS OF BREATH: 0
ABDOMINAL PAIN: 0
RHINORRHEA: 1

## 2021-06-30 NOTE — PROGRESS NOTES
Scott Garzon (:  1975) is a 39 y.o. male,Established patient, here for evaluation of the following chief complaint(s):  Sinus Problem and Hair/Scalp Problem      ASSESSMENT/PLAN:    ICD-10-CM    1. Seasonal allergic rhinitis due to pollen  J30.1 levocetirizine (XYZAL) 5 MG tablet     guaiFENesin (MUCINEX) 600 MG extended release tablet   2. Tobacco abuse disorder  Z72.0 guaiFENesin (MUCINEX) 600 MG extended release tablet     azithromycin (ZITHROMAX) 250 MG tablet (fill Stefani Vicenta only if symptoms worsen, prescription printed and given to the patient)   3. Pigmented skin lesion of uncertain behavior of head  L81.9 External Referral To Dermatology       Return if symptoms worsen or fail to improve. SUBJECTIVE/OBJECTIVE:  HPI     Reports sinus pressure   This started 2-3 weeks ago. Sinus pressure has resolved  Reports cough. \"I still have the drainage. \"  Reports PND. Denies a fever  Denies sore throat. Reports left ear pain   \"I went swimming the other day. \"  Denies N, V or D. Reports a skin lesion on his scalp. He first noticed this lesion about a week ago. \"I am unsure how long it has been there since it is on my head. \"    /88   Pulse 91   Temp 98.5 °F (36.9 °C) (Temporal)   Ht 6' (1.829 m)   Wt 188 lb 6 oz (85.4 kg)   SpO2 98%   BMI 25.55 kg/m²     Review of Systems   Constitutional: Negative for fever. HENT: Positive for congestion, ear pain (\"I went swimming\"), postnasal drip and rhinorrhea. Negative for sore throat. Respiratory: Positive for cough. Negative for shortness of breath. Gastrointestinal: Negative for abdominal pain, diarrhea, nausea and vomiting. Neurological: Negative for headaches. Physical Exam  Vitals reviewed. Constitutional:       Appearance: Normal appearance. He is well-developed and normal weight. HENT:      Head: Normocephalic.         Right Ear: Tympanic membrane, ear canal and external ear normal.      Left Ear: Tympanic membrane, ear canal and external ear normal.      Nose: Congestion and rhinorrhea present. Mouth/Throat:      Pharynx: No posterior oropharyngeal erythema. Cardiovascular:      Rate and Rhythm: Normal rate and regular rhythm. Pulmonary:      Effort: Pulmonary effort is normal.      Breath sounds: Normal breath sounds. Abdominal:      General: Bowel sounds are normal.      Palpations: Abdomen is soft. Musculoskeletal:      Cervical back: Normal range of motion. Lymphadenopathy:      Cervical: No cervical adenopathy. Skin:     General: Skin is dry. Neurological:      Mental Status: He is alert. An electronic signature was used to authenticate this note.     --SAM Farnsworth

## 2021-07-06 ENCOUNTER — PATIENT MESSAGE (OUTPATIENT)
Dept: PRIMARY CARE CLINIC | Age: 46
End: 2021-07-06

## 2021-07-06 DIAGNOSIS — L81.9 PIGMENTED SKIN LESION OF UNCERTAIN BEHAVIOR OF HEAD: ICD-10-CM

## 2021-07-06 DIAGNOSIS — Z87.891 PERSONAL HISTORY OF TOBACCO USE: Primary | ICD-10-CM

## 2021-07-06 NOTE — TELEPHONE ENCOUNTER
From: Vanesa Nunez  To: SAM Miranda  Sent: 7/6/2021 2:25 PM CDT  Subject: Visit Follow-Up Question    I have not received a call yet for the referral to a dermatologist. Also, I had inquired about getting a Chantix prescription called in.  Can that be done or do I need to wait until my physical?

## 2021-07-07 RX ORDER — VARENICLINE TARTRATE
KIT
Qty: 1 BOX | Refills: 0 | Status: SHIPPED | OUTPATIENT
Start: 2021-07-07 | End: 2021-07-22

## 2021-07-22 ENCOUNTER — OFFICE VISIT (OUTPATIENT)
Dept: PRIMARY CARE CLINIC | Age: 46
End: 2021-07-22
Payer: COMMERCIAL

## 2021-07-22 VITALS
SYSTOLIC BLOOD PRESSURE: 130 MMHG | TEMPERATURE: 98.4 F | WEIGHT: 191.5 LBS | HEART RATE: 71 BPM | BODY MASS INDEX: 26.81 KG/M2 | HEIGHT: 71 IN | OXYGEN SATURATION: 98 % | DIASTOLIC BLOOD PRESSURE: 88 MMHG

## 2021-07-22 DIAGNOSIS — Z00.00 ROUTINE PHYSICAL EXAMINATION: Primary | ICD-10-CM

## 2021-07-22 DIAGNOSIS — F51.01 PRIMARY INSOMNIA: ICD-10-CM

## 2021-07-22 DIAGNOSIS — E29.1 HYPOGONADISM MALE: ICD-10-CM

## 2021-07-22 DIAGNOSIS — F41.1 GAD (GENERALIZED ANXIETY DISORDER): ICD-10-CM

## 2021-07-22 PROCEDURE — 99396 PREV VISIT EST AGE 40-64: CPT | Performed by: NURSE PRACTITIONER

## 2021-07-22 RX ORDER — LORAZEPAM 1 MG/1
1 TABLET ORAL 2 TIMES DAILY PRN
Qty: 60 TABLET | Refills: 0 | Status: SHIPPED | OUTPATIENT
Start: 2021-07-22 | End: 2021-08-21

## 2021-07-22 SDOH — ECONOMIC STABILITY: FOOD INSECURITY: WITHIN THE PAST 12 MONTHS, THE FOOD YOU BOUGHT JUST DIDN'T LAST AND YOU DIDN'T HAVE MONEY TO GET MORE.: NEVER TRUE

## 2021-07-22 SDOH — ECONOMIC STABILITY: FOOD INSECURITY: WITHIN THE PAST 12 MONTHS, YOU WORRIED THAT YOUR FOOD WOULD RUN OUT BEFORE YOU GOT MONEY TO BUY MORE.: NEVER TRUE

## 2021-07-22 ASSESSMENT — PATIENT HEALTH QUESTIONNAIRE - PHQ9
SUM OF ALL RESPONSES TO PHQ9 QUESTIONS 1 & 2: 0
SUM OF ALL RESPONSES TO PHQ QUESTIONS 1-9: 0
2. FEELING DOWN, DEPRESSED OR HOPELESS: 0
SUM OF ALL RESPONSES TO PHQ QUESTIONS 1-9: 0
1. LITTLE INTEREST OR PLEASURE IN DOING THINGS: 0
SUM OF ALL RESPONSES TO PHQ QUESTIONS 1-9: 0

## 2021-07-22 ASSESSMENT — ENCOUNTER SYMPTOMS
DIARRHEA: 0
ABDOMINAL PAIN: 0
RHINORRHEA: 0
VOMITING: 0
COUGH: 0
SORE THROAT: 0
SHORTNESS OF BREATH: 0
TROUBLE SWALLOWING: 0
CONSTIPATION: 0
NAUSEA: 0

## 2021-07-22 ASSESSMENT — SOCIAL DETERMINANTS OF HEALTH (SDOH): HOW HARD IS IT FOR YOU TO PAY FOR THE VERY BASICS LIKE FOOD, HOUSING, MEDICAL CARE, AND HEATING?: NOT HARD AT ALL

## 2021-07-22 NOTE — PROGRESS NOTES
2021    Dexter Carlos (:  1975) is a 39 y.o. male, here for a preventive medicine evaluation. Eyes: last year  Dentist:  Last year  Skin:  Spot on head - appt with Dr Martinez  Labs:  due  PSA: no family hx of prostate cancer  Nocturia:  once  Stream: no concerns  ED:  No concerns  Colonoscopy: maternal grandmother in her [de-identified]- had 2 colonoscopies - hx polyps. (Dr Marleni Mcleod)  Exercise: daily activity  Diet and Nut:   No concern  MVI:  none  Supplements:  Keto pill  Asa: none  Depression:  none  Body Image: none  Fall:  none  EOL:  none  Tobacco: 1ppd for 26+ years   Substance: occasional alcohol, no illicits   Immunization:  Needs covid, yearly flu  Sleep: good  Cognition: good    Health Maintenance: needs fasting labs, recommend covid, and yearly flu    Anxiety  Mainly at night  Had been on as needed Ativan for this in the past.  He is requesting a refill. Jinx File shows no signs of abuse. He has not had an Ativan refill since 2019. Patient Active Problem List   Diagnosis    GERD (gastroesophageal reflux disease)    Hyperlipidemia    SELENA (generalized anxiety disorder)       Review of Systems   Constitutional: Negative for activity change, appetite change, fatigue, fever and unexpected weight change. HENT: Negative for ear pain, rhinorrhea, sore throat and trouble swallowing. Eyes: Negative for visual disturbance. Respiratory: Negative for cough and shortness of breath. Cardiovascular: Negative for chest pain, palpitations and leg swelling. Gastrointestinal: Negative for abdominal pain, constipation, diarrhea, nausea and vomiting. Genitourinary: Negative for flank pain. Musculoskeletal: Negative for arthralgias, myalgias, neck pain and neck stiffness. Neurological: Negative for headaches. Psychiatric/Behavioral: Negative for decreased concentration and sleep disturbance. The patient is not nervous/anxious. Prior to Visit Medications    Medication Sig Taking? Authorizing Provider   LORazepam (ATIVAN) 1 MG tablet Take 1 tablet by mouth 2 times daily as needed for Anxiety for up to 30 days. Yes SAM Soto        No Known Allergies    Past Medical History:   Diagnosis Date    GERD (gastroesophageal reflux disease)     Hyperlipidemia        Past Surgical History:   Procedure Laterality Date    VASECTOMY Bilateral 11/2016       Social History     Socioeconomic History    Marital status: Single     Spouse name: Not on file    Number of children: Not on file    Years of education: Not on file    Highest education level: Not on file   Occupational History    Not on file   Tobacco Use    Smoking status: Current Every Day Smoker     Packs/day: 1.00     Years: 19.00     Pack years: 19.00     Types: Cigarettes     Start date: 14    Smokeless tobacco: Never Used   Substance and Sexual Activity    Alcohol use: Yes     Comment: rare    Drug use: No    Sexual activity: Not on file   Other Topics Concern    Not on file   Social History Narrative    Not on file     Social Determinants of Health     Financial Resource Strain: Low Risk     Difficulty of Paying Living Expenses: Not hard at all   Food Insecurity: No Food Insecurity    Worried About [address] in the Last Year: Never true    [facility] in the Last Year: Never true   Transportation Needs:     Lack of Transportation (Medical):      Lack of Transportation (Non-Medical):    Physical Activity:     Days of Exercise per Week:     Minutes of Exercise per Session:    Stress:     Feeling of Stress :    Social Connections:     Frequency of Communication with Friends and Family:     Frequency of Social Gatherings with Friends and Family:     Attends Scientologist Services:     Active Member of Clubs or Organizations:     Attends Club or Organization Meetings:     Marital Status:    Intimate Partner Violence:     Fear of Current or Ex-Partner:     Emotionally Abused:     Physically Abused:     Sexually Abused:         Family History   Problem Relation Age of Onset    Cancer Father        ADVANCE DIRECTIVE: N, <no information>    Vitals:    07/22/21 1505   BP: 130/88   Pulse: 71   Temp: 98.4 °F (36.9 °C)   TempSrc: Temporal   SpO2: 98%   Weight: 191 lb 8 oz (86.9 kg)   Height: 5' 11\" (1.803 m)     Estimated body mass index is 26.71 kg/m² as calculated from the following:    Height as of this encounter: 5' 11\" (1.803 m). Weight as of this encounter: 191 lb 8 oz (86.9 kg). Physical Exam  Vitals reviewed. Constitutional:       Appearance: Normal appearance. He is well-developed. HENT:      Head: Normocephalic and atraumatic. Right Ear: Tympanic membrane, ear canal and external ear normal.      Left Ear: Tympanic membrane, ear canal and external ear normal.      Nose: Nose normal.      Mouth/Throat:      Mouth: Mucous membranes are moist.      Pharynx: Oropharynx is clear. Eyes:      General: No scleral icterus. Conjunctiva/sclera: Conjunctivae normal.      Pupils: Pupils are equal, round, and reactive to light. Neck:      Thyroid: No thyroid mass or thyromegaly. Vascular: No carotid bruit. Cardiovascular:      Rate and Rhythm: Normal rate and regular rhythm. Heart sounds: Normal heart sounds. No murmur heard. Pulmonary:      Effort: Pulmonary effort is normal.      Breath sounds: Normal breath sounds. Abdominal:      General: Bowel sounds are normal. There is no distension. Palpations: Abdomen is soft. Tenderness: There is no abdominal tenderness. There is no guarding or rebound. Genitourinary:     Comments: Exam deferred  Musculoskeletal:         General: Normal range of motion. Cervical back: Normal range of motion and neck supple. No edema. Comments: Joints without swelling or redness   Skin:     General: Skin is warm and dry. Findings: No rash.    Neurological:      Mental Status: He is alert and oriented to person, place, and time. GCS: GCS eye subscore is 4. GCS verbal subscore is 5. GCS motor subscore is 6. Cranial Nerves: Cranial nerves are intact. Sensory: Sensation is intact. Motor: Motor function is intact. Coordination: Coordination normal.      Gait: Gait is intact. Psychiatric:         Mood and Affect: Mood normal.         Speech: Speech normal.         Behavior: Behavior normal.         Thought Content: Thought content normal.         Judgment: Judgment normal.         No flowsheet data found. Lab Results   Component Value Date    CHOL 237 02/11/2019    CHOL 169 04/07/2017    CHOL 154 03/09/2016    TRIG 275 02/11/2019    TRIG 265 04/07/2017    TRIG 204 03/09/2016    HDL 34 02/11/2019    HDL 32 04/07/2017    HDL 35 03/09/2016    LDLCALC 148 02/11/2019    LDLCALC 84 04/07/2017    LDLCALC 78 03/09/2016    GLUCOSE 99 02/11/2019       The 10-year ASCVD risk score (Mariah Mcpherson et al., 2013) is: 10.7%    Values used to calculate the score:      Age: 39 years      Sex: Male      Is Non- : No      Diabetic: No      Tobacco smoker: Yes      Systolic Blood Pressure: 532 mmHg      Is BP treated: No      HDL Cholesterol: 34 mg/dL      Total Cholesterol: 237 mg/dL      There is no immunization history on file for this patient. Health Maintenance   Topic Date Due    Pneumococcal 0-64 years Vaccine (1 of 2 - PPSV23) Never done    COVID-19 Vaccine (1) Never done    DTaP/Tdap/Td vaccine (1 - Tdap) Never done    Flu vaccine (1) 09/01/2021    Lipid screen  02/11/2024    Hepatitis C screen  Completed    HIV screen  Completed    Hepatitis A vaccine  Aged Out    Hepatitis B vaccine  Aged Out    Hib vaccine  Aged Out    Meningococcal (ACWY) vaccine  Aged Out          ASSESSMENT/PLAN:    ICD-10-CM    1.  Routine physical examination  Z00.00 CBC Auto Differential     Comprehensive Metabolic Panel     Lipid Panel     TSH without Reflex     T4, Free     Microalbumin / Creatinine Urine Ratio     Testosterone Free and Total Male     PSA Screening   2. SELENA (generalized anxiety disorder)  F41.1 LORazepam (ATIVAN) 1 MG tablet   3. Primary insomnia  F51.01 LORazepam (ATIVAN) 1 MG tablet   4. Hypogonadism male  E29.1 Testosterone Free and Total Male     PSA Screening     Patient declines Covid vaccine    Return in about 6 months (around 1/22/2022). An electronic signature was used to authenticate this note.     --SAM Ibanez on 7/22/2021 at 5:22 PM

## 2021-07-23 DIAGNOSIS — E29.1 HYPOGONADISM MALE: ICD-10-CM

## 2021-07-23 DIAGNOSIS — Z00.00 ROUTINE PHYSICAL EXAMINATION: ICD-10-CM

## 2021-07-23 LAB
ALBUMIN SERPL-MCNC: 4.8 G/DL (ref 3.5–5.2)
ALP BLD-CCNC: 91 U/L (ref 40–130)
ALT SERPL-CCNC: 26 U/L (ref 5–41)
ANION GAP SERPL CALCULATED.3IONS-SCNC: 15 MMOL/L (ref 7–19)
AST SERPL-CCNC: 19 U/L (ref 5–40)
BASOPHILS ABSOLUTE: 0 K/UL (ref 0–0.2)
BASOPHILS RELATIVE PERCENT: 0.3 % (ref 0–1)
BILIRUB SERPL-MCNC: 0.4 MG/DL (ref 0.2–1.2)
BUN BLDV-MCNC: 14 MG/DL (ref 6–20)
CALCIUM SERPL-MCNC: 9.8 MG/DL (ref 8.6–10)
CHLORIDE BLD-SCNC: 103 MMOL/L (ref 98–111)
CHOLESTEROL, TOTAL: 223 MG/DL (ref 160–199)
CO2: 23 MMOL/L (ref 22–29)
CREAT SERPL-MCNC: 1 MG/DL (ref 0.5–1.2)
CREATININE URINE: 260.9 MG/DL (ref 4.2–622)
EOSINOPHILS ABSOLUTE: 0.1 K/UL (ref 0–0.6)
EOSINOPHILS RELATIVE PERCENT: 1.4 % (ref 0–5)
GFR AFRICAN AMERICAN: >59
GFR NON-AFRICAN AMERICAN: >60
GLUCOSE BLD-MCNC: 99 MG/DL (ref 74–109)
HCT VFR BLD CALC: 49.1 % (ref 42–52)
HDLC SERPL-MCNC: 32 MG/DL (ref 55–121)
HEMOGLOBIN: 16.8 G/DL (ref 14–18)
IMMATURE GRANULOCYTES #: 0 K/UL
LDL CHOLESTEROL CALCULATED: 148 MG/DL
LYMPHOCYTES ABSOLUTE: 2.1 K/UL (ref 1.1–4.5)
LYMPHOCYTES RELATIVE PERCENT: 34.2 % (ref 20–40)
MCH RBC QN AUTO: 29.5 PG (ref 27–31)
MCHC RBC AUTO-ENTMCNC: 34.2 G/DL (ref 33–37)
MCV RBC AUTO: 86.3 FL (ref 80–94)
MICROALBUMIN UR-MCNC: <1.2 MG/DL (ref 0–19)
MICROALBUMIN/CREAT UR-RTO: NORMAL MG/G
MONOCYTES ABSOLUTE: 0.4 K/UL (ref 0–0.9)
MONOCYTES RELATIVE PERCENT: 6.5 % (ref 0–10)
NEUTROPHILS ABSOLUTE: 3.6 K/UL (ref 1.5–7.5)
NEUTROPHILS RELATIVE PERCENT: 57.4 % (ref 50–65)
PDW BLD-RTO: 12.4 % (ref 11.5–14.5)
PLATELET # BLD: 261 K/UL (ref 130–400)
PMV BLD AUTO: 9.1 FL (ref 9.4–12.4)
POTASSIUM SERPL-SCNC: 4.4 MMOL/L (ref 3.5–5)
PROSTATE SPECIFIC ANTIGEN: 1.36 NG/ML (ref 0–4)
RBC # BLD: 5.69 M/UL (ref 4.7–6.1)
SODIUM BLD-SCNC: 141 MMOL/L (ref 136–145)
T4 FREE: 1.27 NG/DL (ref 0.93–1.7)
TOTAL PROTEIN: 7.4 G/DL (ref 6.6–8.7)
TRIGL SERPL-MCNC: 214 MG/DL (ref 0–149)
TSH SERPL DL<=0.05 MIU/L-ACNC: 2.36 UIU/ML (ref 0.27–4.2)
WBC # BLD: 6.3 K/UL (ref 4.8–10.8)

## 2021-07-27 ENCOUNTER — TELEPHONE (OUTPATIENT)
Dept: PRIMARY CARE CLINIC | Age: 46
End: 2021-07-27

## 2021-07-27 DIAGNOSIS — E78.2 MIXED HYPERLIPIDEMIA: Primary | ICD-10-CM

## 2021-07-27 NOTE — TELEPHONE ENCOUNTER
----- Message from SAM Soto sent at 7/23/2021  3:38 PM CDT -----  PSA, prostate antigen is normal. Recommend repeat in 1 year

## 2021-07-27 NOTE — TELEPHONE ENCOUNTER
----- Message from SAM Lugo sent at 7/26/2021  7:22 AM CDT -----  Please call patient and let them know results. No pathologic protein in urine.

## 2021-07-28 ENCOUNTER — TELEPHONE (OUTPATIENT)
Dept: PRIMARY CARE CLINIC | Age: 46
End: 2021-07-28

## 2021-07-28 LAB
SEX HORMONE BINDING GLOBULIN: 27 NMOL/L (ref 11–80)
TESTOSTERONE FREE-NONMALE: 110.3 PG/ML (ref 47–244)
TESTOSTERONE TOTAL: 474 NG/DL (ref 220–1000)

## 2021-07-28 RX ORDER — ATORVASTATIN CALCIUM 20 MG/1
20 TABLET, FILM COATED ORAL DAILY
Qty: 30 TABLET | Refills: 3 | Status: SHIPPED | OUTPATIENT
Start: 2021-07-28 | End: 2022-08-05 | Stop reason: SDUPTHER

## 2021-07-28 NOTE — TELEPHONE ENCOUNTER
----- Message from SAM Abarca sent at 7/28/2021  2:42 PM CDT -----  Please call patient and let them know results.    Normal testosterone

## 2021-07-28 NOTE — TELEPHONE ENCOUNTER
Called patient, spoke with: Patient regarding the results of the patients most recent labs. I advised Patient of [employer] recommendations. Patient did voice understanding    He ran out of lipitor during covid.  Will restart

## 2021-07-30 ENCOUNTER — OFFICE VISIT (OUTPATIENT)
Dept: PRIMARY CARE CLINIC | Age: 46
End: 2021-07-30
Payer: COMMERCIAL

## 2021-07-30 VITALS
HEIGHT: 71 IN | HEART RATE: 74 BPM | SYSTOLIC BLOOD PRESSURE: 130 MMHG | TEMPERATURE: 98.2 F | WEIGHT: 192 LBS | OXYGEN SATURATION: 97 % | BODY MASS INDEX: 26.88 KG/M2 | DIASTOLIC BLOOD PRESSURE: 80 MMHG

## 2021-07-30 DIAGNOSIS — H60.503 ACUTE OTITIS EXTERNA OF BOTH EARS, UNSPECIFIED TYPE: Primary | ICD-10-CM

## 2021-07-30 PROCEDURE — 99213 OFFICE O/P EST LOW 20 MIN: CPT | Performed by: NURSE PRACTITIONER

## 2021-07-30 RX ORDER — CIPROFLOXACIN AND DEXAMETHASONE 3; 1 MG/ML; MG/ML
4 SUSPENSION/ DROPS AURICULAR (OTIC) 2 TIMES DAILY
Qty: 1 BOTTLE | Refills: 0 | Status: SHIPPED | OUTPATIENT
Start: 2021-07-30 | End: 2021-08-06

## 2021-07-30 ASSESSMENT — ENCOUNTER SYMPTOMS
DIARRHEA: 0
COUGH: 0
NAUSEA: 0
ABDOMINAL PAIN: 0
SHORTNESS OF BREATH: 0
TROUBLE SWALLOWING: 0
CONSTIPATION: 0
SORE THROAT: 0
RHINORRHEA: 0
VOMITING: 0

## 2021-07-30 NOTE — PATIENT INSTRUCTIONS

## 2021-07-30 NOTE — PROGRESS NOTES
Dionte Haider (:  1975) is a 39 y.o. male,Established patient, here for evaluation of the following chief complaint(s):  Otalgia      ASSESSMENT/PLAN:    ICD-10-CM    1. Acute otitis externa of both ears, unspecified type  H60.503 ciprofloxacin-dexamethasone (CIPRODEX) 0.3-0.1 % otic suspension       Return if symptoms worsen or fail to improve. SUBJECTIVE/OBJECTIVE:  HPI  Here for bilateral ear pain. Patient states he tried to irrigate his ears for cerumen removal and now the canals feel swollen. Symptoms started a few days ago. No cough, congestion or fever    /80   Pulse 74   Temp 98.2 °F (36.8 °C) (Temporal)   Ht 5' 11\" (1.803 m)   Wt 192 lb (87.1 kg)   SpO2 97%   BMI 26.78 kg/m²     Review of Systems   Constitutional: Negative for activity change, appetite change, fatigue, fever and unexpected weight change. HENT: Positive for ear pain. Negative for rhinorrhea, sore throat and trouble swallowing. Eyes: Negative for visual disturbance. Respiratory: Negative for cough and shortness of breath. Cardiovascular: Negative for chest pain, palpitations and leg swelling. Gastrointestinal: Negative for abdominal pain, constipation, diarrhea, nausea and vomiting. Genitourinary: Negative for flank pain. Musculoskeletal: Negative for arthralgias, myalgias, neck pain and neck stiffness. Neurological: Negative for headaches. Psychiatric/Behavioral: Negative for decreased concentration and sleep disturbance. The patient is not nervous/anxious. Physical Exam  Vitals reviewed. Constitutional:       Appearance: Normal appearance. HENT:      Head: Normocephalic and atraumatic. Right Ear: Swelling and tenderness (tragus) present. Left Ear: Swelling and tenderness (tragus) present. Nose: Nose normal.      Mouth/Throat:      Mouth: Mucous membranes are moist.      Pharynx: Oropharynx is clear.    Eyes:      Conjunctiva/sclera: Conjunctivae normal. Cardiovascular:      Rate and Rhythm: Normal rate and regular rhythm. Pulses: Normal pulses. Heart sounds: Normal heart sounds. Pulmonary:      Effort: Pulmonary effort is normal.      Breath sounds: Normal breath sounds. Musculoskeletal:         General: Normal range of motion. Cervical back: Normal range of motion and neck supple. Skin:     General: Skin is warm. Neurological:      Mental Status: He is alert and oriented to person, place, and time. An electronic signature was used to authenticate this note.     --SAM Abarca

## 2021-08-02 ENCOUNTER — PATIENT MESSAGE (OUTPATIENT)
Dept: PRIMARY CARE CLINIC | Age: 46
End: 2021-08-02

## 2021-08-02 DIAGNOSIS — H60.501 ACUTE OTITIS EXTERNA OF RIGHT EAR, UNSPECIFIED TYPE: Primary | ICD-10-CM

## 2021-08-02 RX ORDER — AMOXICILLIN AND CLAVULANATE POTASSIUM 875; 125 MG/1; MG/1
1 TABLET, FILM COATED ORAL 2 TIMES DAILY
Qty: 20 TABLET | Refills: 0 | Status: SHIPPED | OUTPATIENT
Start: 2021-08-02 | End: 2021-08-12

## 2021-08-02 NOTE — TELEPHONE ENCOUNTER
From: Liliana Celis  To: SAM Weston  Sent: 8/2/2021 7:11 AM CDT  Subject: Visit Follow-Up Question    My ears have shown little to no improvement over the weekend with the drops and I have swelling in my cheek and jaw. Would an oral antibiotic help, or is there something stronger that we can try?

## 2021-08-10 DIAGNOSIS — H60.501 ACUTE OTITIS EXTERNA OF RIGHT EAR, UNSPECIFIED TYPE: ICD-10-CM

## 2021-08-10 RX ORDER — OFLOXACIN 3 MG/ML
5 SOLUTION AURICULAR (OTIC) 2 TIMES DAILY
Qty: 10 ML | Refills: 0 | Status: SHIPPED | OUTPATIENT
Start: 2021-08-10 | End: 2021-08-20

## 2021-08-10 RX ORDER — OFLOXACIN 3 MG/ML
SOLUTION AURICULAR (OTIC)
Qty: 10 ML | Refills: 0 | OUTPATIENT
Start: 2021-08-10

## 2021-12-01 ENCOUNTER — PATIENT MESSAGE (OUTPATIENT)
Dept: PRIMARY CARE CLINIC | Age: 46
End: 2021-12-01

## 2021-12-01 RX ORDER — AMOXICILLIN 500 MG/1
500 CAPSULE ORAL 2 TIMES DAILY
Qty: 20 CAPSULE | Refills: 0 | Status: SHIPPED | OUTPATIENT
Start: 2021-12-01 | End: 2021-12-11

## 2021-12-01 NOTE — TELEPHONE ENCOUNTER
From: Adonay Johnston  To: Grace Herring [place]  Sent: 12/1/2021 2:47 PM CST  Subject: Prescription Question    I had my daughter Terri Peterson [birth year] up there yesterday and she tested positive for strep. I woke up this morning with a low grade fever and a sore throat. Can I get a prescription called in to [address] for me?

## 2022-04-25 ENCOUNTER — E-VISIT (OUTPATIENT)
Dept: PRIMARY CARE CLINIC | Age: 47
End: 2022-04-25
Payer: COMMERCIAL

## 2022-04-25 DIAGNOSIS — J02.9 SORE THROAT: Primary | ICD-10-CM

## 2022-04-25 DIAGNOSIS — Z20.818 EXPOSURE TO STREP THROAT: ICD-10-CM

## 2022-04-25 PROCEDURE — 99422 OL DIG E/M SVC 11-20 MIN: CPT | Performed by: NURSE PRACTITIONER

## 2022-04-25 RX ORDER — AMOXICILLIN 500 MG/1
500 CAPSULE ORAL 3 TIMES DAILY
Qty: 30 CAPSULE | Refills: 0 | Status: SHIPPED | OUTPATIENT
Start: 2022-04-25 | End: 2022-05-05

## 2022-04-25 ASSESSMENT — LIFESTYLE VARIABLES
SMOKING_STATUS: YES
SMOKING_YEARS: 25

## 2022-04-25 NOTE — PROGRESS NOTES
Spent 11-20 minutes on this visit. ICD-10-CM    1. Sore throat  J02.9 amoxicillin (AMOXIL) 500 MG capsule   2.  Exposure to strep throat  Z20.818 amoxicillin (AMOXIL) 500 MG capsule

## 2022-08-01 ENCOUNTER — OFFICE VISIT (OUTPATIENT)
Dept: PRIMARY CARE CLINIC | Age: 47
End: 2022-08-01
Payer: COMMERCIAL

## 2022-08-01 VITALS
TEMPERATURE: 97.6 F | OXYGEN SATURATION: 96 % | DIASTOLIC BLOOD PRESSURE: 76 MMHG | WEIGHT: 189.25 LBS | BODY MASS INDEX: 25.63 KG/M2 | HEART RATE: 98 BPM | HEIGHT: 72 IN | SYSTOLIC BLOOD PRESSURE: 126 MMHG

## 2022-08-01 DIAGNOSIS — Z00.00 ENCOUNTER FOR WELL ADULT EXAM WITHOUT ABNORMAL FINDINGS: ICD-10-CM

## 2022-08-01 DIAGNOSIS — M54.41 ACUTE MIDLINE LOW BACK PAIN WITH RIGHT-SIDED SCIATICA: ICD-10-CM

## 2022-08-01 DIAGNOSIS — Z00.00 ROUTINE PHYSICAL EXAMINATION: ICD-10-CM

## 2022-08-01 DIAGNOSIS — Z72.0 TOBACCO ABUSE DISORDER: ICD-10-CM

## 2022-08-01 DIAGNOSIS — Z00.00 ROUTINE PHYSICAL EXAMINATION: Primary | ICD-10-CM

## 2022-08-01 DIAGNOSIS — Z86.010 HISTORY OF COLON POLYPS: ICD-10-CM

## 2022-08-01 DIAGNOSIS — E78.2 MIXED HYPERLIPIDEMIA: ICD-10-CM

## 2022-08-01 LAB
ALBUMIN SERPL-MCNC: 5 G/DL (ref 3.5–5.2)
ALP BLD-CCNC: 83 U/L (ref 40–130)
ALT SERPL-CCNC: 24 U/L (ref 5–41)
ANION GAP SERPL CALCULATED.3IONS-SCNC: 14 MMOL/L (ref 7–19)
AST SERPL-CCNC: 16 U/L (ref 5–40)
BASOPHILS ABSOLUTE: 0 K/UL (ref 0–0.2)
BASOPHILS RELATIVE PERCENT: 0.5 % (ref 0–1)
BILIRUB SERPL-MCNC: 0.4 MG/DL (ref 0.2–1.2)
BUN BLDV-MCNC: 16 MG/DL (ref 6–20)
CALCIUM SERPL-MCNC: 9.4 MG/DL (ref 8.6–10)
CHLORIDE BLD-SCNC: 103 MMOL/L (ref 98–111)
CHOLESTEROL, TOTAL: 184 MG/DL (ref 160–199)
CO2: 26 MMOL/L (ref 22–29)
CREAT SERPL-MCNC: 1 MG/DL (ref 0.5–1.2)
EOSINOPHILS ABSOLUTE: 0.1 K/UL (ref 0–0.6)
EOSINOPHILS RELATIVE PERCENT: 1 % (ref 0–5)
GFR AFRICAN AMERICAN: >59
GFR NON-AFRICAN AMERICAN: >60
GLUCOSE BLD-MCNC: 85 MG/DL (ref 74–109)
HCT VFR BLD CALC: 47.7 % (ref 42–52)
HDLC SERPL-MCNC: 31 MG/DL (ref 55–121)
HEMOGLOBIN: 15.9 G/DL (ref 14–18)
IMMATURE GRANULOCYTES #: 0 K/UL
LDL CHOLESTEROL CALCULATED: 104 MG/DL
LYMPHOCYTES ABSOLUTE: 2 K/UL (ref 1.1–4.5)
LYMPHOCYTES RELATIVE PERCENT: 31.7 % (ref 20–40)
MCH RBC QN AUTO: 29.3 PG (ref 27–31)
MCHC RBC AUTO-ENTMCNC: 33.3 G/DL (ref 33–37)
MCV RBC AUTO: 88 FL (ref 80–94)
MONOCYTES ABSOLUTE: 0.4 K/UL (ref 0–0.9)
MONOCYTES RELATIVE PERCENT: 5.7 % (ref 0–10)
NEUTROPHILS ABSOLUTE: 3.8 K/UL (ref 1.5–7.5)
NEUTROPHILS RELATIVE PERCENT: 60.8 % (ref 50–65)
PDW BLD-RTO: 12.6 % (ref 11.5–14.5)
PLATELET # BLD: 248 K/UL (ref 130–400)
PMV BLD AUTO: 9.4 FL (ref 9.4–12.4)
POTASSIUM SERPL-SCNC: 4.4 MMOL/L (ref 3.5–5)
RBC # BLD: 5.42 M/UL (ref 4.7–6.1)
SODIUM BLD-SCNC: 143 MMOL/L (ref 136–145)
T4 FREE: 1.23 NG/DL (ref 0.93–1.7)
TOTAL PROTEIN: 7 G/DL (ref 6.6–8.7)
TRIGL SERPL-MCNC: 246 MG/DL (ref 0–149)
TSH SERPL DL<=0.05 MIU/L-ACNC: 1.65 UIU/ML (ref 0.27–4.2)
WBC # BLD: 6.2 K/UL (ref 4.8–10.8)

## 2022-08-01 PROCEDURE — 99406 BEHAV CHNG SMOKING 3-10 MIN: CPT | Performed by: NURSE PRACTITIONER

## 2022-08-01 PROCEDURE — 99396 PREV VISIT EST AGE 40-64: CPT | Performed by: NURSE PRACTITIONER

## 2022-08-01 PROCEDURE — 96372 THER/PROPH/DIAG INJ SC/IM: CPT | Performed by: NURSE PRACTITIONER

## 2022-08-01 RX ORDER — TESTOSTERONE CYPIONATE 200 MG/ML
4 INJECTION INTRAMUSCULAR ONCE
Status: COMPLETED | OUTPATIENT
Start: 2022-08-01 | End: 2022-08-01

## 2022-08-01 RX ORDER — TRIAMCINOLONE ACETONIDE 40 MG/ML
40 INJECTION, SUSPENSION INTRA-ARTICULAR; INTRAMUSCULAR ONCE
Status: COMPLETED | OUTPATIENT
Start: 2022-08-01 | End: 2022-08-01

## 2022-08-01 RX ADMIN — TESTOSTERONE CYPIONATE 4 MG: 200 INJECTION INTRAMUSCULAR at 12:10

## 2022-08-01 RX ADMIN — TRIAMCINOLONE ACETONIDE 40 MG: 40 INJECTION, SUSPENSION INTRA-ARTICULAR; INTRAMUSCULAR at 12:07

## 2022-08-01 SDOH — ECONOMIC STABILITY: FOOD INSECURITY: WITHIN THE PAST 12 MONTHS, THE FOOD YOU BOUGHT JUST DIDN'T LAST AND YOU DIDN'T HAVE MONEY TO GET MORE.: NEVER TRUE

## 2022-08-01 SDOH — ECONOMIC STABILITY: FOOD INSECURITY: WITHIN THE PAST 12 MONTHS, YOU WORRIED THAT YOUR FOOD WOULD RUN OUT BEFORE YOU GOT MONEY TO BUY MORE.: NEVER TRUE

## 2022-08-01 ASSESSMENT — ENCOUNTER SYMPTOMS
SORE THROAT: 0
CONSTIPATION: 0
COUGH: 0
BACK PAIN: 1
RHINORRHEA: 0
ABDOMINAL PAIN: 0
TROUBLE SWALLOWING: 0
VOMITING: 0
DIARRHEA: 0
NAUSEA: 0
SHORTNESS OF BREATH: 0

## 2022-08-01 ASSESSMENT — PATIENT HEALTH QUESTIONNAIRE - PHQ9
10. IF YOU CHECKED OFF ANY PROBLEMS, HOW DIFFICULT HAVE THESE PROBLEMS MADE IT FOR YOU TO DO YOUR WORK, TAKE CARE OF THINGS AT HOME, OR GET ALONG WITH OTHER PEOPLE: 0
SUM OF ALL RESPONSES TO PHQ9 QUESTIONS 1 & 2: 0
2. FEELING DOWN, DEPRESSED OR HOPELESS: 0
SUM OF ALL RESPONSES TO PHQ QUESTIONS 1-9: 0
3. TROUBLE FALLING OR STAYING ASLEEP: 0
SUM OF ALL RESPONSES TO PHQ QUESTIONS 1-9: 0
9. THOUGHTS THAT YOU WOULD BE BETTER OFF DEAD, OR OF HURTING YOURSELF: 0
7. TROUBLE CONCENTRATING ON THINGS, SUCH AS READING THE NEWSPAPER OR WATCHING TELEVISION: 0
6. FEELING BAD ABOUT YOURSELF - OR THAT YOU ARE A FAILURE OR HAVE LET YOURSELF OR YOUR FAMILY DOWN: 0
1. LITTLE INTEREST OR PLEASURE IN DOING THINGS: 0
5. POOR APPETITE OR OVEREATING: 0
SUM OF ALL RESPONSES TO PHQ QUESTIONS 1-9: 0
4. FEELING TIRED OR HAVING LITTLE ENERGY: 0
SUM OF ALL RESPONSES TO PHQ QUESTIONS 1-9: 0
8. MOVING OR SPEAKING SO SLOWLY THAT OTHER PEOPLE COULD HAVE NOTICED. OR THE OPPOSITE, BEING SO FIGETY OR RESTLESS THAT YOU HAVE BEEN MOVING AROUND A LOT MORE THAN USUAL: 0

## 2022-08-01 ASSESSMENT — SOCIAL DETERMINANTS OF HEALTH (SDOH): HOW HARD IS IT FOR YOU TO PAY FOR THE VERY BASICS LIKE FOOD, HOUSING, MEDICAL CARE, AND HEATING?: NOT HARD AT ALL

## 2022-08-01 NOTE — PATIENT INSTRUCTIONS
Well Visit, Ages 25 to 48: Care Instructions  Overview     Well visits can help you stay healthy. Your doctor has checked your overall health and may have suggested ways to take good care of yourself. Your doctor also may have recommended tests. At home, you can help prevent illness withhealthy eating, regular exercise, and other steps. Follow-up care is a key part of your treatment and safety. Be sure to make and go to all appointments, and call your doctor if you are having problems. It's also a good idea to know your test results and keep alist of the medicines you take. How can you care for yourself at home? Get screening tests that you and your doctor decide on. Screening helps find diseases before any symptoms appear. Eat healthy foods. Choose fruits, vegetables, whole grains, protein, and low-fat dairy foods. Limit fat, especially saturated fat. Reduce salt in your diet. Limit alcohol. If you are a man, have no more than 2 drinks a day or 14 drinks a week. If you are a woman, have no more than 1 drink a day or 7 drinks a week. Get at least 30 minutes of physical activity on most days of the week. Walking is a good choice. You also may want to do other activities, such as running, swimming, cycling, or playing tennis or team sports. Discuss any changes in your exercise program with your doctor. Reach and stay at a healthy weight. This will lower your risk for many problems, such as obesity, diabetes, heart disease, and high blood pressure. Do not smoke or allow others to smoke around you. If you need help quitting, talk to your doctor about stop-smoking programs and medicines. These can increase your chances of quitting for good. Care for your mental health. It is easy to get weighed down by worry and stress. Learn strategies to manage stress, like deep breathing and mindfulness, and stay connected with your family and community. If you find you often feel sad or hopeless, talk with your doctor. Treatment can help. Talk to your doctor about whether you have any risk factors for sexually transmitted infections (STIs). You can help prevent STIs if you wait to have sex with a new partner (or partners) until you've each been tested for STIs. It also helps if you use condoms (male or female condoms) and if you limit your sex partners to one person who only has sex with you. Vaccines are available for some STIs, such as HPV. Use birth control if it's important to you to prevent pregnancy. Talk with your doctor about the choices available and what might be best for you. If you think you may have a problem with alcohol or drug use, talk to your doctor. This includes prescription medicines (such as amphetamines and opioids) and illegal drugs (such as cocaine and methamphetamine). Your doctor can help you figure out what type of treatment is best for you. Protect your skin from too much sun. When you're outdoors from 10 a.m. to 4 p.m., stay in the shade or cover up with clothing and a hat with a wide brim. Wear sunglasses that block UV rays. Even when it's cloudy, put broad-spectrum sunscreen (SPF 30 or higher) on any exposed skin. See a dentist one or two times a year for checkups and to have your teeth cleaned. Wear a seat belt in the car. When should you call for help? Watch closely for changes in your health, and be sure to contact your doctor if you have any problems or symptoms that concern you. Where can you learn more? Go to https://randy.health-partners. org and sign in to your New Screens account. Enter P072 in the [facility] box to learn more about \"Well Visit, Ages 25 to 48: Care Instructions. \"     If you do not have an account, please click on the \"Sign Up Now\" link. Current as of: October 6, 2021               Content Version: 13.3  © 2006-2022 Healthwise, [employer]. Care instructions adapted under license by [facility].  If you have questions about a medical condition or this instruction, always ask your healthcare professional. [address] any warranty or liability for your use of this information. A Healthy Lifestyle: Care Instructions  Your Care Instructions     A healthy lifestyle can help you feel good, stay at a healthy weight, and have plenty of energy for both work and play. A healthy lifestyle is something youcan share with your whole family. A healthy lifestyle also can lower your risk for serious health problems, suchas high blood pressure, heart disease, and diabetes. You can follow a few steps listed below to improve your health and the healthof your family. Follow-up care is a key part of your treatment and safety. Be sure to make and go to all appointments, and call your doctor if you are having problems. It's also a good idea to know your test results and keep alist of the medicines you take. How can you care for yourself at home? Do not eat too much sugar, fat, or fast foods. You can still have dessert and treats now and then. The goal is moderation. Start small to improve your eating habits. Pay attention to portion sizes, drink less juice and soda pop, and eat more fruits and vegetables. Eat a healthy amount of food. A 3-ounce serving of meat, for example, is about the size of a deck of cards. Fill the rest of your plate with vegetables and whole grains. Limit the amount of soda and sports drinks you have every day. Drink more water when you are thirsty. Eat plenty of fruits and vegetables every day. Have an apple or some carrot sticks as an afternoon snack instead of a candy bar. Try to have fruits and/or vegetables at every meal.  Make exercise part of your daily routine. You may want to start with simple activities, such as walking, bicycling, or slow swimming. Try to be active 30 to 60 minutes every day. You do not need to do all 30 to 60 minutes all at once.  For example, you can exercise 3 times a day for 10 or 20 minutes. Moderate exercise is safe for most people, but it is always a good idea to talk to your doctor before starting an exercise program.  Keep moving. [place] the lawn, work in the garden, or Be Spotted. Take the stairs instead of the elevator at work. If you smoke, quit. People who smoke have an increased risk for heart attack, stroke, cancer, and other lung illnesses. Quitting is hard, but there are ways to boost your chance of quitting tobacco for good. Use nicotine gum, patches, or lozenges. Ask your doctor about stop-smoking programs and medicines. Keep trying. In addition to reducing your risk of diseases in the future, you will notice some benefits soon after you stop using tobacco. If you have shortness of breath or asthma symptoms, they will likely get better within a few weeks after you quit. Limit how much alcohol you drink. Moderate amounts of alcohol (up to 2 drinks a day for men, 1 drink a day for women) are okay. But drinking too much can lead to liver problems, high blood pressure, and other health problems. Family health  If you have a family, there are many things you can do together to improve yourhealth. Eat meals together as a family as often as possible. Eat healthy foods. This includes fruits, vegetables, lean meats and dairy, and whole grains. Include your family in your fitness plan. Most people think of activities such as jogging or tennis as the way to fitness, but there are many ways you and your family can be more active. Anything that makes you breathe hard and gets your heart pumping is exercise. Here are some tips:  Walk to do errands or to take your child to school or the bus. Go for a family bike ride after dinner instead of watching TV. Where can you learn more? Go to https://randy.healthZinkoTekpartners. org and sign in to your [employer] account. Enter I417 in the [employer] box to learn more about \"A Healthy Lifestyle: Care Instructions. \"     If you do not have an account, please click on the \"Sign Up Now\" link. Current as of: February 9, 2022               Content Version: 13.3  © 2006-2022 Healthwise, [employer]. Care instructions adapted under license by [facility] ([facility]). If you have questions about a medical condition or this instruction, always ask your healthcare professional. [address] any warranty or liability for your use of this information. Smoking Cessation  This document explains the best ways for you to quit smoking and new treatments to help. It lists new medicines that can double or triple your chances of quitting and quitting for good. It also considers ways to avoid relapses and concerns you may have about quitting, including weight gain. NICOTINE: A POWERFUL ADDICTION  If you have tried to quit smoking, you know how hard it can be. It is hard because nicotine is a very addictive drug. For some people, it can be as addictive as heroin or cocaine. Usually, people make 2 or 3 tries, or more, before finally being able to quit. Each time you try to quit, you can learn about what helps and what hurts. Quitting takes hard work and a lot of effort, but you can quit smoking. QUITTING SMOKING IS ONE OF THE MOST IMPORTANT THINGS YOU WILL EVER DO. You will live longer, feel better, and live better. The impact on your body of quitting smoking is felt almost immediately:  Within 20 minutes, blood pressure decreases. Pulse returns to its normal level. After 8 hours, carbon monoxide levels in the blood return to normal. Oxygen level increases. After 24 hours, chance of heart attack starts to decrease. Breath, hair, and body stop smelling like smoke. After 48 hours, damaged nerve endings begin to recover. Sense of taste and smell improve. After 72 hours, the body is virtually free of nicotine. Bronchial tubes relax and breathing becomes easier. After 2 to 12 weeks, lungs can hold more air.  Exercise becomes easier and circulation improves. Quitting will reduce your risk of having a heart attack, stroke, cancer, or lung disease:  After 1 year, the risk of coronary heart disease is cut in half. After 5 years, the risk of stroke falls to the same as a nonsmoker. After 10 years, the risk of lung cancer is cut in half and the risk of other cancers decreases significantly. After 15 years, the risk of coronary heart disease drops, usually to the level of a nonsmoker. If you are pregnant, quitting smoking will improve your chances of having a healthy baby. The people you live with, especially your children, will be healthier. You will have extra money to spend on things other than cigarettes. FIVE KEYS TO QUITTING  Studies have shown that these 5 steps will help you quit smoking and quit for good. You have the best chances of quitting if you use them together:  Get ready. Get support and encouragement. Learn new skills and behaviors. Get medicine to reduce your nicotine addiction and use it correctly. Be prepared for relapse or difficult situations. Be determined to continue trying to quit, even if you do not succeed at first.  1. GET READY  Set a quit date. Change your environment. Get rid of ALL cigarettes, ashtrays, matches, and lighters in your home, car, and place of work. Do not let people smoke in your home. Review your past attempts to quit. Think about what worked and what did not. Once you quit, do not smoke. NOT EVEN A PUFF! 2. GET SUPPORT AND ENCOURAGEMENT  Studies have shown that you have a better chance of being successful if you have help. You can get support in many ways. Tell your family, friends, and coworkers that you are going to quit and need their support. Ask them not to smoke around you. Talk to your caregivers (doctor, dentist, nurse, pharmacist, psychologist, and/or smoking counselor). Get individual, group, or telephone counseling and support.  The more counseling you have, the better your chances are of quitting. Programs are available at [facility]. Call your local health department for information about programs in your area. Spiritual beliefs and practices may help some smokers quit. Quit meters are small computer programs online or downloadable that keep track of quit statistics, such as amount of \"quit-time,\" cigarettes not smoked, and money saved. Many smokers find one or more of the many self-help books available useful in helping them quit and stay off tobacco.  3. LEARN NEW SKILLS AND BEHAVIORS  Try to distract yourself from urges to smoke. Talk to someone, go for a walk, or occupy your time with a task. When you first try to quit, change your routine. Take a different route to work. Drink tea instead of coffee. Eat breakfast in a different place. Do something to reduce your stress. Take a hot bath, exercise, or read a book. Plan something enjoyable to do every day. Reward yourself for not smoking. Explore interactive web-based programs that specialize in helping you quit. 4. GET MEDICINE AND USE IT CORRECTLY  Medicines can help you stop smoking and decrease the urge to smoke. Combining medicine with the above behavioral methods and support can quadruple your chances of successfully quitting smoking. The [place] Food and Drug Administration (FDA) has approved 7 medicines to help you quit smoking. These medicines fall into 3 categories. Nicotine replacement therapy (delivers nicotine to your body without the negative effects and risks of smoking):  Nicotine gum: Available over-the-counter. Nicotine lozenges: Available over-the-counter. Nicotine inhaler: Available by prescription. Nicotine nasal spray: Available by prescription. Nicotine skin patches (transdermal): Available by prescription and over-the-counter. Antidepressant medicine (helps people abstain from smoking, but how this works is unknown):   Bupropion sustained-release (SR) tablets: Available by prescription. Nicotinic receptor partial agonist (simulates the effect of nicotine in your brain):  Varenicline tartrate tablets: Available by prescription. Ask your caregiver for advice about which medicines to use and how to use them. Carefully read the information on the package. Everyone who is trying to quit may benefit from using a medicine. If you are pregnant or trying to become pregnant, nursing an infant, you are under age 25, or you smoke fewer than 10 cigarettes per day, talk to your caregiver before taking any nicotine replacement medicines. You should stop using a nicotine replacement product and call your caregiver if you experience nausea, dizziness, weakness, vomiting, fast or irregular heartbeat, mouth problems with the lozenge or gum, or redness or swelling of the skin around the patch that does not go away. Do not use any other product containing nicotine while using a nicotine replacement product. Talk to your caregiver before using these products if you have diabetes, heart disease, asthma, stomach ulcers, you had a recent heart attack, you have high blood pressure that is not controlled with medicine, a history of irregular heartbeat, or you have been prescribed medicine to help you quit smoking. 5. BE PREPARED FOR RELAPSE OR DIFFICULT SITUATIONS  Most relapses occur within the first 3 months after quitting. Do not be discouraged if you start smoking again. Remember, most people try several times before they finally quit. You may have symptoms of withdrawal because your body is used to nicotine. You may crave cigarettes, be irritable, feel very hungry, cough often, get headaches, or have difficulty concentrating. The withdrawal symptoms are only temporary. They are strongest when you first quit, but they will go away within 10 to 14 days. Here are some difficult situations to watch for:  Alcohol. Avoid drinking alcohol.  Drinking lowers your chances of successfully quitting. Caffeine. Try to reduce the amount of caffeine you consume. It also lowers your chances of successfully quitting. Other smokers. Being around smoking can make you want to smoke. Avoid smokers. Weight gain. Many smokers will gain weight when they quit, usually less than 10 pounds. Eat a healthy diet and stay active. Do not let weight gain distract you from your main goal, quitting smoking. Some medicines that help you quit smoking may also help delay weight gain. You can always lose the weight gained after you quit. Bad mood or depression. There are a lot of ways to improve your mood other than smoking. If you are having problems with any of these situations, talk to your caregiver. SPECIAL SITUATIONS AND CONDITIONS  Studies suggest that everyone can quit smoking. Your situation or condition can give you a special reason to quit. Pregnant women/new mothers: By quitting, you protect your baby's health and your own. Hospitalized patients: By quitting, you reduce health problems and help healing. Heart attack patients: By quitting, you reduce your risk of a second heart attack. Lung, head, and neck cancer patients: By quitting, you reduce your chance of a second cancer. Parents of children and adolescents: By quitting, you protect your children from illnesses caused by secondhand smoke. QUESTIONS TO THINK ABOUT  Think about the following questions before you try to stop smoking. You may want to talk about your answers with your caregiver. Why do you want to quit? If you tried to quit in the past, what helped and what did not? What will be the most difficult situations for you after you quit? How will you plan to handle them? Who can help you through the tough times? Your family? Friends? Caregiver? What pleasures do you get from smoking? What ways can you still get pleasure if you quit? Here are some questions to ask your caregiver: How can you help me to be successful at quitting?   What medicine do you think would be best for me and how should I take it? What should I do if I need more help? What is smoking withdrawal like? How can I get information on withdrawal?  Quitting takes hard work and a lot of effort, but you can quit smoking. FOR MORE INFORMATION   Smokefree. gov (PortableGrid.se) provides free, accurate, evidence-based information and professional assistance to help support the immediate and long-term needs of people trying to quit smoking. Document Released: 12/12/2002 Document Revised: 12/06/2012 Document Reviewed: 10/04/2010  LUDY GOEL [facility] Patient Information ©2012 [employer].

## 2022-08-01 NOTE — PROGRESS NOTES
2022    Ludin Hay (:  1975) is a 42 y.o. male, here for a preventive medicine evaluation. Eyes: scheduled for tomorrow  Dentist:  Last year  Skin:  no concerns  Labs:  due  PSA: no family hx of prostate cancer  Nocturia:  once  Stream: no concerns  ED:  No concerns  Colonoscopy: maternal grandmother in her [de-identified]- had 2 colonoscopies - hx polyps. (Dr Cami Carlisle) - last one 4-5 years ago. Exercise: daily activity  Diet and Nut:   No concern  MVI:  none  Supplements: none  Asa: none  Depression:  none  Body Image: none  Fall:  none  EOL:  none  Tobacco: 1ppd for 27+ years   Substance: occasional alcohol, no illicits   Immunization:  Needs covid, yearly flu, pneumonia vaccine. Sleep: good  Cognition: good     Health Maintenance: needs fasting labs, recommend covid, pneumonia and yearly flu     Lower back pain  Have had issues with it for years  Week ago pulling boat out of garage and it popped. Hurts to move, bend, sit, squat  Pain in right lower back, hip and into thigh. Been taking tylenol, ibuprofen and tried heat. It helped very little. Never had formal eval, have been to chiropractor in the past, not recently. Tobacco abuse  Have tried gum, lozenges and chantix. He wants to quit.      HLP  On lipitor  No concerns  Lab Results   Component Value Date    CHOL 223 (H) 2021    CHOL 237 (H) 2019    CHOL 169 2017     Lab Results   Component Value Date    TRIG 214 (H) 2021    TRIG 275 (H) 2019    TRIG 265 (H) 2017     Lab Results   Component Value Date    HDL 32 (L) 2021    HDL 34 (L) 2019    HDL 32 (L) 2017     Lab Results   Component Value Date    LDLCALC 148 2021    LDLCALC 148 2019    LDLCALC 84 2017     Lab Results   Component Value Date    VLDL 41 2016    VLDL 44 2015     Lab Results   Component Value Date    CHOLHDLRATIO 2.2 2016    CHOLHDLRATIO 92 2015           Patient Active Problem Determinants of Health     Financial Resource Strain: Low Risk     Difficulty of Paying Living Expenses: Not hard at all   Food Insecurity: No Food Insecurity    Worried About Running Out of Food in the Last Year: Never true    Ran Out of Food in the Last Year: Never true   Transportation Needs: Not on file   Physical Activity: Not on file   Stress: Not on file   Social Connections: Not on file   Intimate Partner Violence: Not on file   Housing Stability: Not on file        Family History   Problem Relation Age of Onset    Cancer Father        ADVANCE DIRECTIVE: N, <no information>    Vitals:    08/01/22 1023   BP: 126/76   Pulse: 98   Temp: 97.6 °F (36.4 °C)   TempSrc: Temporal   SpO2: 96%   Weight: 189 lb 4 oz (85.8 kg)   Height: 6' (1.829 m)     Estimated body mass index is 25.67 kg/m² as calculated from the following:    Height as of this encounter: 6' (1.829 m). Weight as of this encounter: 189 lb 4 oz (85.8 kg). Physical Exam  Vitals reviewed. Constitutional:       Appearance: Normal appearance. He is well-developed. HENT:      Head: Normocephalic and atraumatic. Right Ear: Tympanic membrane, ear canal and external ear normal.      Left Ear: Tympanic membrane, ear canal and external ear normal.      Nose: Nose normal.      Comments: masked     Mouth/Throat:      Mouth: Mucous membranes are moist.      Pharynx: Oropharynx is clear. Comments: masked  Eyes:      General: Lids are normal.      Conjunctiva/sclera: Conjunctivae normal.      Pupils: Pupils are equal, round, and reactive to light. Neck:      Thyroid: No thyroid mass. Vascular: No carotid bruit. Trachea: Trachea normal.   Cardiovascular:      Rate and Rhythm: Normal rate and regular rhythm. Pulses: Normal pulses. Heart sounds: Normal heart sounds. No murmur heard. Pulmonary:      Effort: Pulmonary effort is normal.      Breath sounds: Normal breath sounds.    Abdominal:      General: Bowel sounds are normal. There is no distension. Palpations: Abdomen is soft. Tenderness: There is no abdominal tenderness. Genitourinary:     Comments: deferred  Musculoskeletal:         General: Normal range of motion. Cervical back: Full passive range of motion without pain, normal range of motion and neck supple. Lumbar back: Tenderness (lumbar paraspinal muscles) present. No bony tenderness. Normal range of motion. Negative right straight leg raise test and negative left straight leg raise test.      Right lower leg: No edema. Left lower leg: No edema. Lymphadenopathy:      Cervical: No cervical adenopathy. Skin:     General: Skin is warm and dry. Capillary Refill: Capillary refill takes less than 2 seconds. Neurological:      Mental Status: He is alert and oriented to person, place, and time. GCS: GCS eye subscore is 4. GCS verbal subscore is 5. GCS motor subscore is 6. Cranial Nerves: No cranial nerve deficit. Sensory: No sensory deficit. Motor: Motor function is intact. Coordination: Coordination is intact. Coordination normal.      Gait: Gait normal.      Deep Tendon Reflexes: Reflexes are normal and symmetric. Psychiatric:         Attention and Perception: Attention normal.         Mood and Affect: Mood and affect normal.         Speech: Speech normal.         Behavior: Behavior normal. Behavior is cooperative. Thought Content: Thought content normal.         Cognition and Memory: Cognition normal.         Judgment: Judgment normal.       No flowsheet data found.     Lab Results   Component Value Date/Time    CHOL 223 07/23/2021 07:47 AM    CHOL 237 02/11/2019 09:11 AM    CHOL 169 04/07/2017 08:11 AM    TRIG 214 07/23/2021 07:47 AM    TRIG 275 02/11/2019 09:11 AM    TRIG 265 04/07/2017 08:11 AM    HDL 32 07/23/2021 07:47 AM    HDL 34 02/11/2019 09:11 AM    HDL 32 04/07/2017 08:11 AM    LDLCALC 148 07/23/2021 07:47 AM    LDLCALC 148 02/11/2019 09:11 AM [address] 84 04/07/2017 08:11 AM    GLUCOSE 99 07/23/2021 07:47 AM       The 10-year ASCVD risk score (Jessy Martin, et al., 2013) is: 10.3%    Values used to calculate the score:      Age: 42 years      Sex: Male      Is Non- : No      Diabetic: No      Tobacco smoker: Yes      Systolic Blood Pressure: 533 mmHg      Is BP treated: No      HDL Cholesterol: 32 mg/dL      Total Cholesterol: 223 mg/dL      There is no immunization history on file for this patient. Health Maintenance   Topic Date Due    COVID-19 Vaccine (1) Never done    Pneumococcal 0-64 years Vaccine (1 - PCV) Never done    DTaP/Tdap/Td vaccine (1 - Tdap) Never done    Depression Screen  07/22/2022    Lipids  07/23/2022    Flu vaccine (1) 09/01/2022    Hepatitis C screen  Completed    HIV screen  Completed    Hepatitis A vaccine  Aged Out    Hepatitis B vaccine  Aged Out    Hib vaccine  Aged Out    Meningococcal (ACWY) vaccine  Aged Out       ICD-10-CM    1. Routine physical examination  Z00.00 External Referral To Gastroenterology     CBC with Auto Differential     Comprehensive Metabolic Panel     Lipid Panel     TSH     T4, Free      2. Mixed hyperlipidemia  E78.2       3. Tobacco abuse disorder  Z72.0 SC TOBACCO USE CESSATION INTERMEDIATE 3-10 MINUTES    Patient smokes cigarettes on a chronic basis. Approximately 3 minutes of education was provided about quit smoking and the harms of tobacco.  Patient does show understanding. Patient has  the desire to quit smoking in the near future. Websites - http://smokefree. gov & LegalWarrants.ShopText. com   °Quitlines - 1-800-QUIT-NOW (0-203-406-633-550-6185)   °Smokefree Apps - QuitSTART Buddy   °Text Messages - SmokefreeTXT (send the word QUIT to 07749)         4. Acute midline low back pain with right-sided sciatica  M54.41 triamcinolone acetonide (KENALOG-40) injection 40 mg     Dexamethasone Sodium Phosphate injection 4 mg      5.  Encounter for well adult exam without abnormal findings Z00.00 Advised to get pneumonia, yearly flu and covid series. - Declines all       6. History of colon polyps  Z86.010 External Referral To Gastroenterology            Assessment & Plan Return in about 1 year (around 8/1/2023).          --Jurgen Flores, APRN

## 2022-08-02 DIAGNOSIS — E78.2 MIXED HYPERLIPIDEMIA: ICD-10-CM

## 2022-08-02 NOTE — TELEPHONE ENCOUNTER
----- Message from SAM Lora sent at 8/2/2022  7:59 AM CDT -----  Please call patient and let them know results. Normal thyroid  Cholesterol is mildly elevated, would recommend increasing Lipitor to 40 mg daily high-fiber low-fat diet and repeat fasting CMP and lipid panel in 3 months  Your metabolic profile is normal.  This includes kidney and liver functions as well as electrolytes.   Normal blood counts

## 2022-08-05 RX ORDER — ATORVASTATIN CALCIUM 40 MG/1
40 TABLET, FILM COATED ORAL DAILY
Qty: 90 TABLET | Refills: 3 | Status: SHIPPED | OUTPATIENT
Start: 2022-08-05

## 2022-08-05 NOTE — TELEPHONE ENCOUNTER
Called patient, spoke with: Patient regarding the results of the patients most recent labs. I advised Patient of Wandalee Ryland recommendations.    Patient did voice understanding

## 2023-05-30 ENCOUNTER — PATIENT MESSAGE (OUTPATIENT)
Dept: FAMILY MEDICINE CLINIC | Age: 48
End: 2023-05-30

## 2023-05-30 RX ORDER — DOXYCYCLINE HYCLATE 100 MG
100 TABLET ORAL 2 TIMES DAILY
Qty: 20 TABLET | Refills: 0 | Status: SHIPPED | OUTPATIENT
Start: 2023-05-30 | End: 2023-06-09

## 2024-01-19 ENCOUNTER — OFFICE VISIT (OUTPATIENT)
Dept: FAMILY MEDICINE CLINIC | Age: 49
End: 2024-01-19
Payer: COMMERCIAL

## 2024-01-19 VITALS
HEIGHT: 72 IN | BODY MASS INDEX: 25.73 KG/M2 | HEART RATE: 103 BPM | TEMPERATURE: 97.9 F | WEIGHT: 190 LBS | OXYGEN SATURATION: 98 % | SYSTOLIC BLOOD PRESSURE: 134 MMHG | DIASTOLIC BLOOD PRESSURE: 88 MMHG

## 2024-01-19 DIAGNOSIS — E78.2 MIXED HYPERLIPIDEMIA: ICD-10-CM

## 2024-01-19 DIAGNOSIS — J02.0 ACUTE STREPTOCOCCAL PHARYNGITIS: Primary | ICD-10-CM

## 2024-01-19 DIAGNOSIS — R50.9 FEVER, UNSPECIFIED FEVER CAUSE: ICD-10-CM

## 2024-01-19 DIAGNOSIS — J02.9 SORE THROAT: ICD-10-CM

## 2024-01-19 LAB
INFLUENZA A ANTIBODY: NORMAL
INFLUENZA B ANTIBODY: NORMAL
S PYO AG THROAT QL: POSITIVE

## 2024-01-19 PROCEDURE — 99213 OFFICE O/P EST LOW 20 MIN: CPT | Performed by: NURSE PRACTITIONER

## 2024-01-19 RX ORDER — ATORVASTATIN CALCIUM 40 MG/1
40 TABLET, FILM COATED ORAL DAILY
Qty: 90 TABLET | Refills: 0 | Status: SHIPPED | OUTPATIENT
Start: 2024-01-19

## 2024-01-19 RX ORDER — AMOXICILLIN 500 MG/1
500 CAPSULE ORAL 3 TIMES DAILY
Qty: 30 CAPSULE | Refills: 0 | Status: SHIPPED | OUTPATIENT
Start: 2024-01-19 | End: 2024-01-29

## 2024-01-19 ASSESSMENT — ENCOUNTER SYMPTOMS
SORE THROAT: 1
ABDOMINAL PAIN: 0

## 2024-01-19 NOTE — PROGRESS NOTES
Sam Coley (:  1975) is a 48 y.o. male,Established patient, here for evaluation of the following chief complaint(s):  Sore Throat and Congestion      ASSESSMENT/PLAN:    ICD-10-CM    1. Acute streptococcal pharyngitis  J02.0 amoxicillin (AMOXIL) 500 MG capsule      2. Sore throat  J02.9 POCT rapid strep A     POCT Influenza A/B: negative      3. Fever, unspecified fever cause  R50.9 POCT rapid strep A     POCT Influenza A/B      4. Mixed hyperlipidemia  E78.2 CBC with Auto Differential     Comprehensive Metabolic Panel     TSH     T4, Free     Lipid Panel     Microalbumin / Creatinine Urine Ratio     atorvastatin (LIPITOR) 40 MG tablet          Return if symptoms worsen or fail to improve.    SUBJECTIVE/OBJECTIVE:  HPI    Reports sore throat  Reports congestion.  Denies HA  Denies abdominal pain.  \"I think I had fever overnight.\"  Symptoms started yesterday evening.    /88   Pulse (!) 103   Temp 97.9 °F (36.6 °C) (Temporal)   Ht 1.829 m (6')   Wt 86.2 kg (190 lb)   SpO2 98%   BMI 25.77 kg/m²     Review of Systems   Constitutional:  Positive for fever (\"I think I had fever last night\").   HENT:  Positive for congestion and sore throat.    Gastrointestinal:  Negative for abdominal pain.   Neurological:  Negative for headaches.       Physical Exam  Vitals reviewed.   Constitutional:       Appearance: He is well-developed.   HENT:      Head: Normocephalic.      Right Ear: Tympanic membrane and external ear normal.      Left Ear: Tympanic membrane and external ear normal.      Nose: Nose normal.      Mouth/Throat:      Pharynx: Posterior oropharyngeal erythema present.      Tonsils: Tonsillar exudate present. 3+ on the right. 3+ on the left.   Cardiovascular:      Rate and Rhythm: Normal rate and regular rhythm.   Pulmonary:      Effort: Pulmonary effort is normal.      Breath sounds: Normal breath sounds. No wheezing, rhonchi or rales.   Abdominal:      General: Bowel sounds are normal.

## 2024-03-11 ENCOUNTER — OFFICE VISIT (OUTPATIENT)
Dept: FAMILY MEDICINE CLINIC | Age: 49
End: 2024-03-11
Payer: COMMERCIAL

## 2024-03-11 VITALS
BODY MASS INDEX: 25.23 KG/M2 | DIASTOLIC BLOOD PRESSURE: 82 MMHG | SYSTOLIC BLOOD PRESSURE: 120 MMHG | OXYGEN SATURATION: 98 % | WEIGHT: 186 LBS | TEMPERATURE: 98.7 F | HEART RATE: 80 BPM

## 2024-03-11 DIAGNOSIS — R52 BODY ACHES: ICD-10-CM

## 2024-03-11 DIAGNOSIS — J02.9 SORE THROAT: Primary | ICD-10-CM

## 2024-03-11 LAB
INFLUENZA A ANTIBODY: NORMAL
INFLUENZA B ANTIBODY: NORMAL
S PYO AG THROAT QL: NORMAL

## 2024-03-11 PROCEDURE — 87804 INFLUENZA ASSAY W/OPTIC: CPT | Performed by: NURSE PRACTITIONER

## 2024-03-11 PROCEDURE — 99213 OFFICE O/P EST LOW 20 MIN: CPT | Performed by: NURSE PRACTITIONER

## 2024-03-11 PROCEDURE — 87880 STREP A ASSAY W/OPTIC: CPT | Performed by: NURSE PRACTITIONER

## 2024-03-11 RX ORDER — AMOXICILLIN AND CLAVULANATE POTASSIUM 875; 125 MG/1; MG/1
1 TABLET, FILM COATED ORAL 2 TIMES DAILY
Qty: 20 TABLET | Refills: 0 | Status: SHIPPED | OUTPATIENT
Start: 2024-03-11 | End: 2024-03-21

## 2024-03-11 SDOH — ECONOMIC STABILITY: INCOME INSECURITY: HOW HARD IS IT FOR YOU TO PAY FOR THE VERY BASICS LIKE FOOD, HOUSING, MEDICAL CARE, AND HEATING?: NOT HARD AT ALL

## 2024-03-11 SDOH — ECONOMIC STABILITY: HOUSING INSECURITY
IN THE LAST 12 MONTHS, WAS THERE A TIME WHEN YOU DID NOT HAVE A STEADY PLACE TO SLEEP OR SLEPT IN A SHELTER (INCLUDING NOW)?: NO

## 2024-03-11 SDOH — ECONOMIC STABILITY: FOOD INSECURITY: WITHIN THE PAST 12 MONTHS, YOU WORRIED THAT YOUR FOOD WOULD RUN OUT BEFORE YOU GOT MONEY TO BUY MORE.: NEVER TRUE

## 2024-03-11 SDOH — ECONOMIC STABILITY: FOOD INSECURITY: WITHIN THE PAST 12 MONTHS, THE FOOD YOU BOUGHT JUST DIDN'T LAST AND YOU DIDN'T HAVE MONEY TO GET MORE.: NEVER TRUE

## 2024-03-11 ASSESSMENT — ENCOUNTER SYMPTOMS
TROUBLE SWALLOWING: 0
VOMITING: 0
SORE THROAT: 1
NAUSEA: 0
COUGH: 0
SHORTNESS OF BREATH: 0

## 2024-03-11 ASSESSMENT — PATIENT HEALTH QUESTIONNAIRE - PHQ9
SUM OF ALL RESPONSES TO PHQ QUESTIONS 1-9: 0
SUM OF ALL RESPONSES TO PHQ9 QUESTIONS 1 & 2: 0
1. LITTLE INTEREST OR PLEASURE IN DOING THINGS: 0
2. FEELING DOWN, DEPRESSED OR HOPELESS: 0
SUM OF ALL RESPONSES TO PHQ QUESTIONS 1-9: 0

## 2024-03-11 NOTE — PROGRESS NOTES
SUBJECTIVE:  Sore throat   Patient ID: Sam Coley is a 48 y.o. male.    HPI:   Pharyngitis  Associated symptoms include myalgias and a sore throat. Pertinent negatives include no congestion, coughing, nausea or vomiting.   Generalized Body Aches  Associated symptoms include myalgias and a sore throat. Pertinent negatives include no congestion, coughing, nausea or vomiting.        STarted last night. Sore throat and then fingers and toes started to ache today  NO fever.   Some drainage.   4 weeks he had strep then daughter then son   No headache.     Past Medical History:   Diagnosis Date    GERD (gastroesophageal reflux disease)     Hyperlipidemia       Prior to Visit Medications    Medication Sig Taking? Authorizing Provider   amoxicillin-clavulanate (AUGMENTIN) 875-125 MG per tablet Take 1 tablet by mouth 2 times daily for 10 days Yes Yokasta Collazo APRN   atorvastatin (LIPITOR) 40 MG tablet Take 1 tablet by mouth daily Yes Katie Perdue APRN     No Known Allergies    Review of Systems   Constitutional:  Negative for appetite change.   HENT:  Positive for postnasal drip and sore throat. Negative for congestion and trouble swallowing.    Respiratory:  Negative for cough and shortness of breath.    Gastrointestinal:  Negative for nausea and vomiting.   Musculoskeletal:  Positive for myalgias.       OBJECTIVE:    Physical Exam   /82 (Site: Left Upper Arm, Position: Sitting)   Pulse 80   Temp 98.7 °F (37.1 °C) (Temporal)   Wt 84.4 kg (186 lb)   SpO2 98%   BMI 25.23 kg/m²      ASSESSMENT:      ICD-10-CM    1. Sore throat  J02.9 POCT rapid strep A     POCT Influenza A/B     amoxicillin-clavulanate (AUGMENTIN) 875-125 MG per tablet     COVID-19     CANCELED: COVID-19      2. Body aches  R52 POCT rapid strep A     POCT Influenza A/B     COVID-19     CANCELED: COVID-19          PLAN:    Sam Coley: Pharyngitis (Patient presents today c/o congestion, sore throat and body aches.

## 2024-03-12 ENCOUNTER — TELEPHONE (OUTPATIENT)
Dept: FAMILY MEDICINE CLINIC | Age: 49
End: 2024-03-12

## 2024-03-12 LAB — SARS-COV-2 N GENE RESP QL NAA+PROBE: NOT DETECTED

## 2024-03-12 NOTE — TELEPHONE ENCOUNTER
----- Message from SAM Bean sent at 3/12/2024  7:31 AM CDT -----  Please call pt with normal results.

## 2024-03-12 NOTE — TELEPHONE ENCOUNTER
Called patient, verified name and date of birth, and gave results in detail as per provider notes below. Patient verbalized understanding and had no further questions.  Patient states he is doing slightly better.

## 2024-08-07 ENCOUNTER — OFFICE VISIT (OUTPATIENT)
Dept: FAMILY MEDICINE CLINIC | Age: 49
End: 2024-08-07
Payer: COMMERCIAL

## 2024-08-07 VITALS
TEMPERATURE: 97.1 F | BODY MASS INDEX: 23.98 KG/M2 | OXYGEN SATURATION: 98 % | DIASTOLIC BLOOD PRESSURE: 80 MMHG | HEIGHT: 72 IN | HEART RATE: 67 BPM | SYSTOLIC BLOOD PRESSURE: 128 MMHG | WEIGHT: 177 LBS

## 2024-08-07 DIAGNOSIS — E78.2 MIXED HYPERLIPIDEMIA: ICD-10-CM

## 2024-08-07 DIAGNOSIS — Z00.00 ROUTINE PHYSICAL EXAMINATION: ICD-10-CM

## 2024-08-07 DIAGNOSIS — R79.89 LOW TESTOSTERONE IN MALE: ICD-10-CM

## 2024-08-07 DIAGNOSIS — Z86.010 HISTORY OF COLON POLYPS: ICD-10-CM

## 2024-08-07 DIAGNOSIS — F17.200 TOBACCO USE DISORDER: ICD-10-CM

## 2024-08-07 DIAGNOSIS — Z00.00 ROUTINE PHYSICAL EXAMINATION: Primary | ICD-10-CM

## 2024-08-07 PROBLEM — Z86.0100 HISTORY OF COLON POLYPS: Status: ACTIVE | Noted: 2024-08-07

## 2024-08-07 LAB
ALBUMIN SERPL-MCNC: 4.6 G/DL (ref 3.5–5.2)
ALP SERPL-CCNC: 78 U/L (ref 40–130)
ALT SERPL-CCNC: 16 U/L (ref 5–41)
ANION GAP SERPL CALCULATED.3IONS-SCNC: 13 MMOL/L (ref 7–19)
AST SERPL-CCNC: 16 U/L (ref 5–40)
BASOPHILS # BLD: 0 K/UL (ref 0–0.2)
BASOPHILS NFR BLD: 0.5 % (ref 0–1)
BILIRUB SERPL-MCNC: 0.3 MG/DL (ref 0.2–1.2)
BUN SERPL-MCNC: 16 MG/DL (ref 6–20)
CALCIUM SERPL-MCNC: 9.2 MG/DL (ref 8.6–10)
CHLORIDE SERPL-SCNC: 102 MMOL/L (ref 98–111)
CHOLEST SERPL-MCNC: 217 MG/DL (ref 160–199)
CO2 SERPL-SCNC: 26 MMOL/L (ref 22–29)
CREAT SERPL-MCNC: 1.2 MG/DL (ref 0.5–1.2)
EOSINOPHIL # BLD: 0.1 K/UL (ref 0–0.6)
EOSINOPHIL NFR BLD: 1.7 % (ref 0–5)
ERYTHROCYTE [DISTWIDTH] IN BLOOD BY AUTOMATED COUNT: 12.2 % (ref 11.5–14.5)
GLUCOSE SERPL-MCNC: 94 MG/DL (ref 74–109)
HCT VFR BLD AUTO: 46.7 % (ref 42–52)
HDLC SERPL-MCNC: 41 MG/DL (ref 55–121)
HGB BLD-MCNC: 14.9 G/DL (ref 14–18)
IMM GRANULOCYTES # BLD: 0 K/UL
LDLC SERPL CALC-MCNC: 148 MG/DL
LYMPHOCYTES # BLD: 1.8 K/UL (ref 1.1–4.5)
LYMPHOCYTES NFR BLD: 42.6 % (ref 20–40)
MCH RBC QN AUTO: 28.5 PG (ref 27–31)
MCHC RBC AUTO-ENTMCNC: 31.9 G/DL (ref 33–37)
MCV RBC AUTO: 89.3 FL (ref 80–94)
MONOCYTES # BLD: 0.4 K/UL (ref 0–0.9)
MONOCYTES NFR BLD: 8.8 % (ref 0–10)
NEUTROPHILS # BLD: 1.9 K/UL (ref 1.5–7.5)
NEUTS SEG NFR BLD: 46.2 % (ref 50–65)
PLATELET # BLD AUTO: 246 K/UL (ref 130–400)
PMV BLD AUTO: 8.9 FL (ref 9.4–12.4)
POTASSIUM SERPL-SCNC: 3.9 MMOL/L (ref 3.5–5)
PROT SERPL-MCNC: 6.9 G/DL (ref 6.6–8.7)
RBC # BLD AUTO: 5.23 M/UL (ref 4.7–6.1)
SODIUM SERPL-SCNC: 141 MMOL/L (ref 136–145)
T4 FREE SERPL-MCNC: 1.29 NG/DL (ref 0.93–1.7)
TRIGL SERPL-MCNC: 138 MG/DL (ref 0–149)
TSH SERPL DL<=0.005 MIU/L-ACNC: 2.64 UIU/ML (ref 0.27–4.2)
WBC # BLD AUTO: 4.1 K/UL (ref 4.8–10.8)

## 2024-08-07 PROCEDURE — 99396 PREV VISIT EST AGE 40-64: CPT | Performed by: NURSE PRACTITIONER

## 2024-08-07 PROCEDURE — 99406 BEHAV CHNG SMOKING 3-10 MIN: CPT | Performed by: NURSE PRACTITIONER

## 2024-08-07 SDOH — ECONOMIC STABILITY: FOOD INSECURITY: WITHIN THE PAST 12 MONTHS, YOU WORRIED THAT YOUR FOOD WOULD RUN OUT BEFORE YOU GOT MONEY TO BUY MORE.: NEVER TRUE

## 2024-08-07 SDOH — ECONOMIC STABILITY: FOOD INSECURITY: WITHIN THE PAST 12 MONTHS, THE FOOD YOU BOUGHT JUST DIDN'T LAST AND YOU DIDN'T HAVE MONEY TO GET MORE.: NEVER TRUE

## 2024-08-07 SDOH — ECONOMIC STABILITY: INCOME INSECURITY: HOW HARD IS IT FOR YOU TO PAY FOR THE VERY BASICS LIKE FOOD, HOUSING, MEDICAL CARE, AND HEATING?: NOT HARD AT ALL

## 2024-08-07 ASSESSMENT — ENCOUNTER SYMPTOMS
RHINORRHEA: 0
COUGH: 0
SORE THROAT: 0
ABDOMINAL PAIN: 0
VOMITING: 0
NAUSEA: 0
DIARRHEA: 0
SHORTNESS OF BREATH: 0
TROUBLE SWALLOWING: 0
CONSTIPATION: 0

## 2024-08-07 ASSESSMENT — PATIENT HEALTH QUESTIONNAIRE - PHQ9
3. TROUBLE FALLING OR STAYING ASLEEP: SEVERAL DAYS
SUM OF ALL RESPONSES TO PHQ QUESTIONS 1-9: 1
SUM OF ALL RESPONSES TO PHQ QUESTIONS 1-9: 1
10. IF YOU CHECKED OFF ANY PROBLEMS, HOW DIFFICULT HAVE THESE PROBLEMS MADE IT FOR YOU TO DO YOUR WORK, TAKE CARE OF THINGS AT HOME, OR GET ALONG WITH OTHER PEOPLE: NOT DIFFICULT AT ALL
4. FEELING TIRED OR HAVING LITTLE ENERGY: NOT AT ALL
9. THOUGHTS THAT YOU WOULD BE BETTER OFF DEAD, OR OF HURTING YOURSELF: NOT AT ALL
SUM OF ALL RESPONSES TO PHQ QUESTIONS 1-9: 1
SUM OF ALL RESPONSES TO PHQ9 QUESTIONS 1 & 2: 0
2. FEELING DOWN, DEPRESSED OR HOPELESS: NOT AT ALL
7. TROUBLE CONCENTRATING ON THINGS, SUCH AS READING THE NEWSPAPER OR WATCHING TELEVISION: NOT AT ALL
SUM OF ALL RESPONSES TO PHQ QUESTIONS 1-9: 1
5. POOR APPETITE OR OVEREATING: NOT AT ALL
1. LITTLE INTEREST OR PLEASURE IN DOING THINGS: NOT AT ALL
8. MOVING OR SPEAKING SO SLOWLY THAT OTHER PEOPLE COULD HAVE NOTICED. OR THE OPPOSITE, BEING SO FIGETY OR RESTLESS THAT YOU HAVE BEEN MOVING AROUND A LOT MORE THAN USUAL: NOT AT ALL
6. FEELING BAD ABOUT YOURSELF - OR THAT YOU ARE A FAILURE OR HAVE LET YOURSELF OR YOUR FAMILY DOWN: NOT AT ALL

## 2024-08-07 NOTE — PROGRESS NOTES
2024    Sam Coley (:  1975) is a 48 y.o. male, here for a preventive medicine evaluation.    Subjective      Eyes:2 years ago  Dentist: routinely  Skin:  no concerns  Labs:  due  PSA: no family hx of prostate cancer  Nocturia:  once  Stream: no concerns  ED:  No concerns  Colonoscopy: maternal grandmother in her 80s- had 2 colonoscopies - hx polyps. (Dr Melara) - last one 4-5 years ago.- I am going to get it done.   Exercise: daily activity  Diet and Nut:   No concern  MVI:  none  Supplements: none  Asa: none  Depression:  none  Body Image: none  Fall:  none  EOL:  none  Tobacco: 1ppd for 28+ years   Substance: occasional alcohol, no illicits   Immunization:  Needs covid, yearly flu, pneumonia vaccine.   Sleep: good  Cognition: good     Health Maintenance: needs fasting labs, recommend covid, pneumonia and yearly flu      Tobacco abuse       HLP  Quit taking lipitor  No concerns     Lab Results   Component Value Date    CHOL 184 2022    TRIG 246 (H) 2022    HDL 31 (L) 2022     2022    VLDL 41 2016    CHOLHDLRATIO 2.2 2016     Low Testosterone  Been on testosterone replacement.   Would like to recheck. Have not been on replacement in over a year.   He is taking otc testosterone boosters - M180 from [facility].       Patient Active Problem List   Diagnosis    GERD (gastroesophageal reflux disease)    Hyperlipidemia    SELENA (generalized anxiety disorder)    History of colon polyps       Review of Systems   Constitutional:  Negative for activity change, appetite change, fatigue, fever and unexpected weight change.   HENT:  Negative for ear pain, rhinorrhea, sore throat and trouble swallowing.    Eyes:  Negative for visual disturbance.   Respiratory:  Negative for cough and shortness of breath.    Cardiovascular:  Negative for chest pain, palpitations and leg swelling.   Gastrointestinal:  Negative for abdominal pain, constipation, diarrhea, nausea and

## 2024-08-08 ENCOUNTER — TELEPHONE (OUTPATIENT)
Dept: FAMILY MEDICINE CLINIC | Age: 49
End: 2024-08-08

## 2024-08-08 DIAGNOSIS — E78.2 MIXED HYPERLIPIDEMIA: Primary | ICD-10-CM

## 2024-08-08 RX ORDER — ATORVASTATIN CALCIUM 20 MG/1
20 TABLET, FILM COATED ORAL DAILY
Qty: 30 TABLET | Refills: 5 | Status: SHIPPED | OUTPATIENT
Start: 2024-08-08

## 2024-08-08 NOTE — TELEPHONE ENCOUNTER
----- Message from SAM Quintana sent at 8/7/2024  5:08 PM CDT -----  CMP is within normal limits.  This includes normal electrolytes, kidney function and liver function

## 2024-08-08 NOTE — TELEPHONE ENCOUNTER
----- Message from SAM Jang sent at 8/7/2024  4:04 PM CDT -----  Please call patient and let them know results.   Normal thyroid

## 2024-08-08 NOTE — TELEPHONE ENCOUNTER
----- Message from SAM Jang sent at 8/7/2024  1:36 PM CDT -----  Please call patient and let them know results.   Your metabolic profile is normal.  This includes kidney and liver functions as well as electrolytes.  Cholesterol is elevated.  Recommend restarting atorvastatin 40 mg daily, high-fiber low-fat diet.  Repeat fasting CMP and lipid panel in 3 months    Other labs pending

## 2024-08-08 NOTE — TELEPHONE ENCOUNTER
Called patient, spoke with: Patient regarding the results of the patients most recent labs.  I advised Patient of Arvind Jarrell recommendations.   Patient did voice understanding       Pt states he is willing to restart atorvastatin but he would like to start back at 20 MG first before going to 40 MG

## 2024-08-09 ENCOUNTER — TELEPHONE (OUTPATIENT)
Dept: FAMILY MEDICINE CLINIC | Age: 49
End: 2024-08-09

## 2024-08-09 LAB
SHBG SERPL-SCNC: 116 PG/ML (ref 47–244)
SHBG SERPL-SCNC: 49 NMOL/L (ref 17–56)
TESTOST SERPL-MCNC: 677 NG/DL (ref 249–836)

## 2024-08-09 NOTE — TELEPHONE ENCOUNTER
Called patient, spoke with: Patient regarding the results of the patients most recent labs.  I advised Patient of Arvind Jarrell recommendations.   Patient did voice understanding

## 2024-08-09 NOTE — TELEPHONE ENCOUNTER
----- Message from SAM Jang sent at 8/9/2024 12:33 PM CDT -----  Please call patient and let them know results.   Normal testosterone

## 2024-10-07 DIAGNOSIS — E78.2 MIXED HYPERLIPIDEMIA: ICD-10-CM

## 2024-10-07 RX ORDER — ATORVASTATIN CALCIUM 20 MG/1
20 TABLET, FILM COATED ORAL DAILY
Qty: 90 TABLET | Refills: 3 | Status: SHIPPED | OUTPATIENT
Start: 2024-10-07

## 2024-10-07 NOTE — TELEPHONE ENCOUNTER
Received fax from pharmacy requesting refill on pts medication(s). Pt was last seen in office on 8/7/2024  and has a follow up scheduled for Visit date not found. Will send request to  Dr. Jacobo  for authorization.     Requested Prescriptions     Pending Prescriptions Disp Refills    atorvastatin (LIPITOR) 20 MG tablet 90 tablet 3     Sig: Take 1 tablet by mouth daily

## 2024-12-23 ENCOUNTER — PATIENT MESSAGE (OUTPATIENT)
Dept: FAMILY MEDICINE CLINIC | Age: 49
End: 2024-12-23

## 2024-12-23 RX ORDER — AMOXICILLIN 500 MG/1
500 CAPSULE ORAL 2 TIMES DAILY
Qty: 20 CAPSULE | Refills: 0 | Status: SHIPPED | OUTPATIENT
Start: 2024-12-23 | End: 2025-01-02

## 2025-02-05 ENCOUNTER — PATIENT MESSAGE (OUTPATIENT)
Age: 50
End: 2025-02-05

## 2025-02-05 DIAGNOSIS — R79.89 LOW TESTOSTERONE IN MALE: Primary | ICD-10-CM

## 2025-02-05 DIAGNOSIS — E78.2 MIXED HYPERLIPIDEMIA: ICD-10-CM

## 2025-02-05 RX ORDER — ATORVASTATIN CALCIUM 20 MG/1
20 TABLET, FILM COATED ORAL DAILY
Qty: 90 TABLET | Refills: 3 | Status: SHIPPED | OUTPATIENT
Start: 2025-02-05

## 2025-02-06 ENCOUNTER — TELEPHONE (OUTPATIENT)
Age: 50
End: 2025-02-06

## 2025-02-06 DIAGNOSIS — E78.2 MIXED HYPERLIPIDEMIA: ICD-10-CM

## 2025-02-06 DIAGNOSIS — R79.89 LOW TESTOSTERONE IN MALE: ICD-10-CM

## 2025-02-06 LAB
ALBUMIN SERPL-MCNC: 5 G/DL (ref 3.5–5.2)
ALP SERPL-CCNC: 72 U/L (ref 40–129)
ALT SERPL-CCNC: 39 U/L (ref 5–41)
ANION GAP SERPL CALCULATED.3IONS-SCNC: 13 MMOL/L (ref 8–16)
AST SERPL-CCNC: 25 U/L (ref 5–40)
BASOPHILS # BLD: 0 K/UL (ref 0–0.2)
BASOPHILS NFR BLD: 0.6 % (ref 0–1)
BILIRUB SERPL-MCNC: 0.6 MG/DL (ref 0.2–1.2)
BUN SERPL-MCNC: 19 MG/DL (ref 6–20)
CALCIUM SERPL-MCNC: 9.6 MG/DL (ref 8.6–10)
CHLORIDE SERPL-SCNC: 104 MMOL/L (ref 98–107)
CHOLEST SERPL-MCNC: 158 MG/DL (ref 0–199)
CO2 SERPL-SCNC: 27 MMOL/L (ref 22–29)
CREAT SERPL-MCNC: 1.2 MG/DL (ref 0.7–1.2)
EOSINOPHIL # BLD: 0.1 K/UL (ref 0–0.6)
EOSINOPHIL NFR BLD: 1.3 % (ref 0–5)
ERYTHROCYTE [DISTWIDTH] IN BLOOD BY AUTOMATED COUNT: 12.6 % (ref 11.5–14.5)
GLUCOSE SERPL-MCNC: 84 MG/DL (ref 70–99)
HCT VFR BLD AUTO: 46.3 % (ref 42–52)
HDLC SERPL-MCNC: 43 MG/DL (ref 40–60)
HGB BLD-MCNC: 16 G/DL (ref 14–18)
IMM GRANULOCYTES # BLD: 0 K/UL
LDLC SERPL CALC-MCNC: 96 MG/DL
LYMPHOCYTES # BLD: 2 K/UL (ref 1.1–4.5)
LYMPHOCYTES NFR BLD: 42.9 % (ref 20–40)
MCH RBC QN AUTO: 29.5 PG (ref 27–31)
MCHC RBC AUTO-ENTMCNC: 34.6 G/DL (ref 33–37)
MCV RBC AUTO: 85.3 FL (ref 80–94)
MONOCYTES # BLD: 0.4 K/UL (ref 0–0.9)
MONOCYTES NFR BLD: 8.1 % (ref 0–10)
NEUTROPHILS # BLD: 2.2 K/UL (ref 1.5–7.5)
NEUTS SEG NFR BLD: 46.9 % (ref 50–65)
PLATELET # BLD AUTO: 232 K/UL (ref 130–400)
PMV BLD AUTO: 8.9 FL (ref 9.4–12.4)
POTASSIUM SERPL-SCNC: 4.1 MMOL/L (ref 3.5–5.1)
PROT SERPL-MCNC: 7.3 G/DL (ref 6.4–8.3)
RBC # BLD AUTO: 5.43 M/UL (ref 4.7–6.1)
SODIUM SERPL-SCNC: 144 MMOL/L (ref 136–145)
TRIGL SERPL-MCNC: 93 MG/DL (ref 0–149)
WBC # BLD AUTO: 4.7 K/UL (ref 4.8–10.8)

## 2025-02-06 NOTE — TELEPHONE ENCOUNTER
----- Message from Dr. LACEY Jacobo DO sent at 2/6/2025  1:01 PM CST -----  Your WBC, (infection fighting ability) Hgb and Hct, (oxygen carrying cells) are normal; as is your percentage of each cell type.  Your metabolic profile is normal.  This includes kidney and liver functions as well as electrolytes.  Lipids are controlled.

## 2025-02-07 LAB
SHBG SERPL-SCNC: 138.2 PG/ML (ref 47–244)
SHBG SERPL-SCNC: 42 NMOL/L (ref 10–60)
TESTOST SERPL-MCNC: 716 NG/DL (ref 249–836)

## 2025-02-10 ENCOUNTER — TELEPHONE (OUTPATIENT)
Age: 50
End: 2025-02-10

## 2025-02-10 NOTE — TELEPHONE ENCOUNTER
----- Message from Dr. LACEY Jacobo DO sent at 2/7/2025  7:12 PM CST -----  Testosterone is normal

## 2025-02-10 NOTE — TELEPHONE ENCOUNTER
Called patient, spoke with: Patient regarding the results of the patients most recent labs.  I advised Patient of Dr. Jacobo recommendations.   Patient did voice understanding

## 2025-05-14 ENCOUNTER — PATIENT MESSAGE (OUTPATIENT)
Age: 50
End: 2025-05-14

## 2025-05-14 DIAGNOSIS — Z86.0100 HISTORY OF COLONIC POLYPS: Primary | ICD-10-CM

## 2025-05-19 ENCOUNTER — TELEPHONE (OUTPATIENT)
Dept: GASTROENTEROLOGY | Age: 50
End: 2025-05-19

## 2025-06-09 ENCOUNTER — TELEPHONE (OUTPATIENT)
Dept: GASTROENTEROLOGY | Age: 50
End: 2025-06-09

## 2025-06-09 NOTE — TELEPHONE ENCOUNTER
called patient to confirm  procedure scheduled for   6/12/25@1200  with Dr. Ramey at [facility]    Patient hs pep instructions & prescription

## 2025-06-12 ENCOUNTER — APPOINTMENT (OUTPATIENT)
Dept: OPERATING ROOM | Age: 50
End: 2025-06-12
Attending: INTERNAL MEDICINE

## 2025-06-12 ENCOUNTER — ANESTHESIA EVENT (OUTPATIENT)
Dept: OPERATING ROOM | Age: 50
End: 2025-06-12

## 2025-06-12 ENCOUNTER — HOSPITAL ENCOUNTER (OUTPATIENT)
Age: 50
Setting detail: OUTPATIENT SURGERY
Discharge: HOME | End: 2025-06-12
Attending: INTERNAL MEDICINE | Admitting: INTERNAL MEDICINE

## 2025-06-12 ENCOUNTER — ANESTHESIA (OUTPATIENT)
Dept: OPERATING ROOM | Age: 50
End: 2025-06-12

## 2025-06-12 VITALS
WEIGHT: 186 LBS | DIASTOLIC BLOOD PRESSURE: 84 MMHG | SYSTOLIC BLOOD PRESSURE: 134 MMHG | RESPIRATION RATE: 18 BRPM | TEMPERATURE: 97.9 F | HEART RATE: 73 BPM | HEIGHT: 72 IN | BODY MASS INDEX: 25.19 KG/M2 | OXYGEN SATURATION: 98 %

## 2025-06-12 PROCEDURE — G8917 PT W IV AB NOT GIVEN ON TIME: HCPCS

## 2025-06-12 PROCEDURE — G0105 COLORECTAL SCRN; HI RISK IND: HCPCS

## 2025-06-12 PROCEDURE — G8907 PT DOC NO EVENTS ON DISCHARG: HCPCS

## 2025-06-12 RX ORDER — PROPOFOL 10 MG/ML
INJECTION, EMULSION INTRAVENOUS
Status: DISCONTINUED | OUTPATIENT
Start: 2025-06-12 | End: 2025-06-12 | Stop reason: SDUPTHER

## 2025-06-12 RX ORDER — LIDOCAINE HYDROCHLORIDE 10 MG/ML
INJECTION, SOLUTION EPIDURAL; INFILTRATION; INTRACAUDAL; PERINEURAL
Status: DISCONTINUED | OUTPATIENT
Start: 2025-06-12 | End: 2025-06-12 | Stop reason: SDUPTHER

## 2025-06-12 RX ORDER — PROPOFOL 10 MG/ML
INJECTION, EMULSION INTRAVENOUS
Status: DISCONTINUED | OUTPATIENT
Start: 2025-06-12 | End: 2025-06-12

## 2025-06-12 RX ORDER — SODIUM CHLORIDE, SODIUM LACTATE, POTASSIUM CHLORIDE, CALCIUM CHLORIDE 600; 310; 30; 20 MG/100ML; MG/100ML; MG/100ML; MG/100ML
INJECTION, SOLUTION INTRAVENOUS CONTINUOUS
Status: DISCONTINUED | OUTPATIENT
Start: 2025-06-12 | End: 2025-06-12 | Stop reason: HOSPADM

## 2025-06-12 RX ADMIN — PROPOFOL 300 MG: 10 INJECTION, EMULSION INTRAVENOUS at 13:28

## 2025-06-12 RX ADMIN — LIDOCAINE HYDROCHLORIDE 50 MG: 10 INJECTION, SOLUTION EPIDURAL; INFILTRATION; INTRACAUDAL; PERINEURAL at 13:28

## 2025-06-12 RX ADMIN — SODIUM CHLORIDE, SODIUM LACTATE, POTASSIUM CHLORIDE, CALCIUM CHLORIDE: 600; 310; 30; 20 INJECTION, SOLUTION INTRAVENOUS at 13:02

## 2025-06-12 ASSESSMENT — PAIN - FUNCTIONAL ASSESSMENT: PAIN_FUNCTIONAL_ASSESSMENT: FACE, LEGS, ACTIVITY, CRY, AND CONSOLABILITY (FLACC)

## 2025-06-12 NOTE — ANESTHESIA PRE PROCEDURE
Date/Time    COVID19 Not Detected 03/11/2024 02:32 PM           Anesthesia Evaluation  Patient summary reviewed and Nursing notes reviewed   no history of anesthetic complications:   Airway: Mallampati: II  TM distance: >3 FB   Neck ROM: full  Mouth opening: > = 3 FB   Dental: normal exam         Pulmonary:normal exam                              ROS comment: Vape   Cardiovascular:    (+) hyperlipidemia                  Neuro/Psych:   (+) psychiatric history: stable with treatment            GI/Hepatic/Renal:   (+) GERD:, bowel prep          Endo/Other:                     Abdominal: normal exam            Vascular:          Other Findings:       Anesthesia Plan      general and TIVA     ASA 2       Induction: intravenous.      Anesthetic plan and risks discussed with patient.    Use of blood products discussed with patient whom.                  Sapna Alfonso, APRN - CRNA   6/12/2025

## 2025-06-12 NOTE — OP NOTE
Patient: Sam Coley : 1975  Med Rec#: [id number] Acc#: [id number]   Primary Care Provider LACEY Jacobo DO    Date of Procedure:  2025    Endoscopist: Bucky Ramey MD    Referring Provider: LACEY Jacobo DO    Operation Performed: Colonoscopy     Indications: Screening- family hx of colon cancer    Anesthesia:  Sedation was administered by anesthesia who monitored the patient during the procedure.    I met with Sam Coley prior to procedure. We discussed the procedure itself, and I have discussed the risks of endoscopy (including-- but not limited to-- pain, discomfort, bleeding potentially requiring second endoscopic procedure and/or blood transfusion, organ perforation requiring operative repair, damage to organs near the colon, infection, aspiration, cardiopulmonary/allergic reaction), benefits, indications to endoscopy. Additionally, we discussed options other than colonoscopy. The patient expressed understanding. All questions answered. The patient decided to proceed with the procedure.  Signed informed consent was placed on the chart.    Blood Loss: minimal    Withdrawal time: > 6 min  Bowel Prep: adequate     Complications: no immediate complications    DESCRIPTION OF PROCEDURE:     A time out was performed. After written informed consent was obtained, the patient was placed in the left lateral position.     The perianal area was inspected, and a digital rectal exam was performed. A rectal exam was performed: normal tone, no palpable lesions. At this point, a forward viewing Olympus colonoscope was inserted into the anus and carefully advanced to the cecum.  The cecum was identified by the ileocecal valve and the appendiceal orifice. The colonoscope was then slowly withdrawn with careful inspection of the mucosa in a linear and circumferential fashion. The scope was retroflexed in the rectum. Suction was utilized during the procedure to remove as much air as  possible from the bowel. The colonoscope was removed from the patient, and the procedure was terminated.  Findings are listed below.        Findings:     The mucosa appeared normal throughout the entire examined colon.    Retroflexion in the rectum was normal and revealed no further abnormalities..        Recommendations:  1. Repeat colonoscopy: 5 years for colon cancer screening.    Findings and recommendations were discussed w/ the patient. A copy of the images was provided.    IRoseanne, am scribing for and in the presence of Dr. Bucky Ramey MD.  Electronically signed by Roseanne Gutierrez RN on 6/12/2025 at 1:35 PM    I personally performed the services described in this documentation as scribed by Roseanne Gutierrez, and it appears accurate and complete.     Bucky Ramey MD  6/12/2025

## 2025-06-12 NOTE — ANESTHESIA POSTPROCEDURE EVALUATION
Department of Anesthesiology  Postprocedure Note    Patient: Sam Coley  MRN: [id number]  YOB: 1975  Date of evaluation: 6/12/2025    Procedure Summary       Date: 06/12/25 Room / Location: [place] [address] / [facility]    Anesthesia Start: 1324 Anesthesia Stop: 1339    Procedure: COLORECTAL CANCER SCREENING, NOT HIGH RISK (Abdomen) Diagnosis:       Screen for colon cancer      History of colon polyps      (Screen for colon cancer [Z12.11])      (History of colon polyps [Z86.0100])    Surgeons: Bucky Ramey MD Responsible Provider: Sapna Alfonso APRN - CRNA    Anesthesia Type: General, TIVA ASA Status: 2            Anesthesia Type: General, TIVA    Beatriz Phase I:      Beatriz Phase II:      Anesthesia Post Evaluation    Patient location during evaluation: bedside  Patient participation: complete - patient participated  Level of consciousness: awake and alert  Pain score: 0  Airway patency: patent  Nausea & Vomiting: no nausea and no vomiting  Cardiovascular status: blood pressure returned to baseline  Respiratory status: acceptable, spontaneous ventilation, room air and nonlabored ventilation  Hydration status: euvolemic  Comments: /83   Pulse 69   Temp 99 °F (37.2 °C) (Temporal)   Resp 14   Ht 1.829 m (6')   Wt 84.4 kg (186 lb)   SpO2 97%   BMI 25.23 kg/m²     Pain management: adequate    No notable events documented.

## 2025-06-12 NOTE — H&P
Patient Name: Sam Coley  : 1975  MRN: [id number]  DATE: 25    Allergies: No Known Allergies     ENDOSCOPY  History and Physical    Procedure:    [] Diagnostic Colonoscopy       [x] Screening Colonoscopy  [] EGD      [] ERCP      [] EUS       [] Other    [x] Previous office notes/History and Physical reviewed from the patients chart. Please see EMR for further details of HPI. I have examined the patient's status immediately prior to the procedure and:      Indications/HPI:       [x] Screening              [] History of Polyps      [x]Fhx of colon CA/polyps []+Cologard/DNA/Stool Testing      Anesthesia:   [x] MAC [] Moderate Sedation   [] General   [] None     ROS: 12 pt review of systems was negative unless stated above    Medications:   Prior to Admission medications    Medication Sig Start Date End Date Taking? Authorizing Provider   atorvastatin (LIPITOR) 20 MG tablet Take 1 tablet by mouth daily 25   LACEY Jacobo DO       Past Medical History:  Past Medical History:   Diagnosis Date    GERD (gastroesophageal reflux disease)     Hyperlipidemia        Past Surgical History:  Past Surgical History:   Procedure Laterality Date    VASECTOMY Bilateral 2016       Social History:  Social History     Tobacco Use    Smoking status: Former     Current packs/day: 0.00     Average packs/day: 1 pack/day for 29.0 years (29.0 ttl pk-yrs)     Types: Cigarettes     Start date:      Quit date:      Years since quittin.4    Smokeless tobacco: Never   Vaping Use    Vaping status: Every Day    Substances: Nicotine, Flavoring   Substance Use Topics    Alcohol use: Yes     Comment: rare    Drug use: No       Vital Signs:   Vitals:    25 1255   BP: 126/83   Pulse: 69   Resp: 14   Temp: 99 °F (37.2 °C)   SpO2: 97%        Physical Exam:  Cardiac:  [x]WNL []Comments:  Pulmonary:  [x]WNL   []Comments:  Neuro/Mental Status:  [x]WNL  []Comments:  Abdominal:  [x]WNL

## 2025-06-12 NOTE — DISCHARGE INSTRUCTIONS
Recommendations:  1. Repeat colonoscopy: 5 years for colon cancer screening.    POST-OP ORDERS: ENDOSCOPY & COLONOSCOPY:    1. Rest today.    2. DO NOT eat or drink until wide awake; eat your usual diet today in moderate amount only.    3. DO NOT drive today.    4. Call physician if you have severe pain, vomiting, fever, rectal bleeding or black bowel movements.    5.  If a biopsy was taken or a polyp removed, you should expect to hear results in about 21 days.  If you have heard nothing from your physician by then, call the office for results.    6.  Discharge home when patient awake, vitals signs stable and tolerating liquids.    7. Call with questions or concerns [phone].

## 2025-07-16 PROBLEM — Z12.11 SCREENING FOR COLON CANCER: Status: ACTIVE | Noted: 2025-07-16

## 2025-08-11 ASSESSMENT — PATIENT HEALTH QUESTIONNAIRE - PHQ9
SUM OF ALL RESPONSES TO PHQ QUESTIONS 1-9: 0
2. FEELING DOWN, DEPRESSED OR HOPELESS: NOT AT ALL
10. IF YOU CHECKED OFF ANY PROBLEMS, HOW DIFFICULT HAVE THESE PROBLEMS MADE IT FOR YOU TO DO YOUR WORK, TAKE CARE OF THINGS AT HOME, OR GET ALONG WITH OTHER PEOPLE: NOT DIFFICULT AT ALL
SUM OF ALL RESPONSES TO PHQ QUESTIONS 1-9: 0
9. THOUGHTS THAT YOU WOULD BE BETTER OFF DEAD, OR OF HURTING YOURSELF: NOT AT ALL
SUM OF ALL RESPONSES TO PHQ QUESTIONS 1-9: 0
6. FEELING BAD ABOUT YOURSELF - OR THAT YOU ARE A FAILURE OR HAVE LET YOURSELF OR YOUR FAMILY DOWN: NOT AT ALL
8. MOVING OR SPEAKING SO SLOWLY THAT OTHER PEOPLE COULD HAVE NOTICED. OR THE OPPOSITE, BEING SO FIGETY OR RESTLESS THAT YOU HAVE BEEN MOVING AROUND A LOT MORE THAN USUAL: NOT AT ALL
7. TROUBLE CONCENTRATING ON THINGS, SUCH AS READING THE NEWSPAPER OR WATCHING TELEVISION: NOT AT ALL
2. FEELING DOWN, DEPRESSED OR HOPELESS: NOT AT ALL
3. TROUBLE FALLING OR STAYING ASLEEP: NOT AT ALL
1. LITTLE INTEREST OR PLEASURE IN DOING THINGS: NOT AT ALL
SUM OF ALL RESPONSES TO PHQ QUESTIONS 1-9: 0
1. LITTLE INTEREST OR PLEASURE IN DOING THINGS: NOT AT ALL
SUM OF ALL RESPONSES TO PHQ9 QUESTIONS 1 & 2: 0
4. FEELING TIRED OR HAVING LITTLE ENERGY: NOT AT ALL
5. POOR APPETITE OR OVEREATING: NOT AT ALL

## 2025-08-14 ENCOUNTER — OFFICE VISIT (OUTPATIENT)
Age: 50
End: 2025-08-14
Payer: COMMERCIAL

## 2025-08-14 VITALS
OXYGEN SATURATION: 100 % | TEMPERATURE: 98.3 F | HEIGHT: 72 IN | WEIGHT: 174 LBS | DIASTOLIC BLOOD PRESSURE: 74 MMHG | HEART RATE: 98 BPM | BODY MASS INDEX: 23.57 KG/M2 | SYSTOLIC BLOOD PRESSURE: 108 MMHG

## 2025-08-14 DIAGNOSIS — M77.11 LATERAL EPICONDYLITIS OF RIGHT ELBOW: ICD-10-CM

## 2025-08-14 DIAGNOSIS — R68.82 DECREASED LIBIDO: ICD-10-CM

## 2025-08-14 DIAGNOSIS — Z00.00 ROUTINE PHYSICAL EXAMINATION: Primary | ICD-10-CM

## 2025-08-14 PROCEDURE — 99396 PREV VISIT EST AGE 40-64: CPT | Performed by: NURSE PRACTITIONER

## 2025-08-14 RX ORDER — CAYENNE 450 MG
CAPSULE ORAL DAILY
COMMUNITY

## 2025-08-14 SDOH — ECONOMIC STABILITY: FOOD INSECURITY: WITHIN THE PAST 12 MONTHS, YOU WORRIED THAT YOUR FOOD WOULD RUN OUT BEFORE YOU GOT MONEY TO BUY MORE.: NEVER TRUE

## 2025-08-14 SDOH — ECONOMIC STABILITY: FOOD INSECURITY: WITHIN THE PAST 12 MONTHS, THE FOOD YOU BOUGHT JUST DIDN'T LAST AND YOU DIDN'T HAVE MONEY TO GET MORE.: NEVER TRUE

## 2025-08-14 ASSESSMENT — ENCOUNTER SYMPTOMS
RHINORRHEA: 0
NAUSEA: 0
ABDOMINAL PAIN: 0
COUGH: 0
CONSTIPATION: 0
SORE THROAT: 0
DIARRHEA: 0
TROUBLE SWALLOWING: 0
VOMITING: 0
SHORTNESS OF BREATH: 0

## 2025-08-14 ASSESSMENT — LIFESTYLE VARIABLES: HOW OFTEN DO YOU HAVE A DRINK CONTAINING ALCOHOL: MONTHLY OR LESS

## 2025-08-15 DIAGNOSIS — Z00.00 ROUTINE PHYSICAL EXAMINATION: ICD-10-CM

## 2025-08-15 DIAGNOSIS — R68.82 DECREASED LIBIDO: ICD-10-CM

## 2025-08-15 PROBLEM — Z12.11 SCREENING FOR COLON CANCER: Status: RESOLVED | Noted: 2025-07-16 | Resolved: 2025-08-15

## 2025-08-15 LAB
ALBUMIN SERPL-MCNC: 4.5 G/DL (ref 3.5–5.2)
ALP SERPL-CCNC: 49 U/L (ref 40–129)
ALT SERPL-CCNC: 75 U/L (ref 10–50)
ANION GAP SERPL CALCULATED.3IONS-SCNC: 11 MMOL/L (ref 8–16)
AST SERPL-CCNC: 26 U/L (ref 10–50)
BASOPHILS # BLD: 0 K/UL (ref 0–0.2)
BASOPHILS NFR BLD: 0.3 % (ref 0–1)
BILIRUB SERPL-MCNC: 0.4 MG/DL (ref 0.2–1.2)
BUN SERPL-MCNC: 16 MG/DL (ref 6–20)
CALCIUM SERPL-MCNC: 9.5 MG/DL (ref 8.6–10)
CHLORIDE SERPL-SCNC: 106 MMOL/L (ref 98–107)
CHOLEST SERPL-MCNC: 160 MG/DL (ref 0–199)
CO2 SERPL-SCNC: 23 MMOL/L (ref 22–29)
CREAT SERPL-MCNC: 1.3 MG/DL (ref 0.7–1.2)
CREAT UR-MCNC: 283 MG/DL (ref 39–259)
EOSINOPHIL # BLD: 0.2 K/UL (ref 0–0.6)
EOSINOPHIL NFR BLD: 2.5 % (ref 0–5)
ERYTHROCYTE [DISTWIDTH] IN BLOOD BY AUTOMATED COUNT: 13.8 % (ref 11.5–14.5)
FSH SERPL-SCNC: 3.3 MIU/ML
GLUCOSE SERPL-MCNC: 111 MG/DL (ref 70–99)
HCT VFR BLD AUTO: 46.6 % (ref 42–52)
HDLC SERPL-MCNC: 33 MG/DL (ref 40–60)
HGB BLD-MCNC: 15.6 G/DL (ref 14–18)
IMM GRANULOCYTES # BLD: 0 K/UL
LDLC SERPL CALC-MCNC: 102 MG/DL
LH SERPL-ACNC: 4.3 MIU/ML
LYMPHOCYTES # BLD: 2.4 K/UL (ref 1.1–4.5)
LYMPHOCYTES NFR BLD: 39.8 % (ref 20–40)
MCH RBC QN AUTO: 28.3 PG (ref 27–31)
MCHC RBC AUTO-ENTMCNC: 33.5 G/DL (ref 33–37)
MCV RBC AUTO: 84.4 FL (ref 80–94)
MICROALBUMIN UR-MCNC: <1.2 MG/DL (ref 0–1.99)
MICROALBUMIN/CREAT UR-RTO: ABNORMAL MG/G (ref 0–29)
MONOCYTES # BLD: 0.5 K/UL (ref 0–0.9)
MONOCYTES NFR BLD: 8 % (ref 0–10)
NEUTROPHILS # BLD: 2.9 K/UL (ref 1.5–7.5)
NEUTS SEG NFR BLD: 49.1 % (ref 50–65)
PLATELET # BLD AUTO: 234 K/UL (ref 130–400)
PMV BLD AUTO: 9.1 FL (ref 9.4–12.4)
POTASSIUM SERPL-SCNC: 4.3 MMOL/L (ref 3.5–5.1)
PROLACTIN SERPL-MCNC: 13.6 NG/ML (ref 4.04–15.2)
PROT SERPL-MCNC: 6.6 G/DL (ref 6.4–8.3)
PSA SERPL-MCNC: 1.55 NG/ML (ref 0–4)
RBC # BLD AUTO: 5.52 M/UL (ref 4.7–6.1)
SODIUM SERPL-SCNC: 140 MMOL/L (ref 136–145)
T4 FREE SERPL-MCNC: 1.23 NG/DL (ref 0.93–1.7)
TRIGL SERPL-MCNC: 124 MG/DL (ref 0–149)
TSH SERPL DL<=0.005 MIU/L-ACNC: 3.56 UIU/ML (ref 0.27–4.2)
WBC # BLD AUTO: 5.9 K/UL (ref 4.8–10.8)

## 2025-08-19 LAB
SHBG SERPL-SCNC: 148.9 PG/ML (ref 47–244)
SHBG SERPL-SCNC: 77 NMOL/L (ref 17–56)
TESTOST SERPL-MCNC: 1105 NG/DL (ref 249–836)

## (undated) DEVICE — BRUSH ENDOSCP 2 END CHN HEDGEHOG

## (undated) DEVICE — ADAPTER CLEANING PORPOISE CLEANING

## (undated) DEVICE — CLEANING SPONGE: Brand: KOALA™

## (undated) DEVICE — COLON KIT WITH 1.1 OZ ORCA HYDRA SEAL 2 GOWN

## (undated) DEVICE — SUPPLEMENT DIGESTIVE H2O SOL GI-EASE

## (undated) DEVICE — CANNULA NSL AD L7FT DIV O2 CO2 W/ M LUERLOCK TRMPT CONN

## (undated) DEVICE — SINGLE PORT MANIFOLD: Brand: NEPTUNE 2